# Patient Record
Sex: FEMALE | Race: OTHER | Employment: UNEMPLOYED | URBAN - METROPOLITAN AREA
[De-identification: names, ages, dates, MRNs, and addresses within clinical notes are randomized per-mention and may not be internally consistent; named-entity substitution may affect disease eponyms.]

---

## 2018-02-13 ENCOUNTER — OFFICE VISIT (OUTPATIENT)
Dept: FAMILY MEDICINE CLINIC | Age: 28
End: 2018-02-13

## 2018-02-13 VITALS
HEIGHT: 66 IN | BODY MASS INDEX: 40.98 KG/M2 | DIASTOLIC BLOOD PRESSURE: 85 MMHG | TEMPERATURE: 97.6 F | WEIGHT: 255 LBS | SYSTOLIC BLOOD PRESSURE: 135 MMHG | HEART RATE: 80 BPM

## 2018-02-13 DIAGNOSIS — B35.1 ONYCHOMYCOSIS: Primary | ICD-10-CM

## 2018-02-13 DIAGNOSIS — Z23 ENCOUNTER FOR IMMUNIZATION: ICD-10-CM

## 2018-02-13 RX ORDER — CICLOPIROX 80 MG/ML
SOLUTION TOPICAL
Qty: 1 BOTTLE | Refills: 2 | Status: SHIPPED | OUTPATIENT
Start: 2018-02-13 | End: 2021-10-10

## 2018-02-13 NOTE — PROGRESS NOTES
Statements below were documented by Mario Mcknight RNRequest for flu vaccine, patient denies any egg or latex allergy. Patient given VIS sheet and information about flu shot, verbalizes understanding and has no questions. Flu shot administered per protocol after administering injection, patient waited for 15 minutes. Patient showed no signs or symptoms of  allergic reactionRequest for TDAP vaccine, patient denies any allergies. Patient given VIS and  information about TDAP,  Patient verbalized understanding and denies questions . Tdap administered per protocol. After administered, waited 15 minutes . Patient showed no sins or symptoms if allergic reaction. Patient denies redness or itching from IM injection site. My  for this patient visit was Radha Baca.  Mario Mcknight RN

## 2018-02-13 NOTE — PROGRESS NOTES
Coordination of Care  1. Have you been to the ER, urgent care clinic since your last visit? Hospitalized since your last visit? No    2. Have you seen or consulted any other health care providers outside of the [address] [place] [address] since your last visit? Include any pap smears or colon screening. No    Medications  Does the patient need refills? NO    Learning Assessment Complete?  yes

## 2018-02-13 NOTE — MR AVS SNAPSHOT
Meredith Majestic 
 
 
 [address] [facility] [address] [place] [address] 
[phone] Patient: Marcelo Villarreal MRN: [id number] REVA:[id number] Visit Information Janeth Chambers Personal Médico Departamento Teléfono del Dep. Número de visita 2/13/2018  2:45 PM Mary Moran MD AN- [address] [phone] Upcoming Health Maintenance Date Due DTaP/Tdap/Td series (1 - Tdap) 1/6/2009 PAP AKA CERVICAL CYTOLOGY 1/6/2009 Influenza Age 5 to Adult 8/1/2017 Alergias  Review Complete El: 2/13/2018 Por: Mary Moran MD  
 A partir del:  2/13/2018 No Known Allergies Vacunas actuales Maryln Jing No hay ninguna vacuna archivada. No revisadas esta visita Partes vitales PS Pulso Temperatura [place] ( percentil de crecimiento) Peso (percentil de crecimiento) LMP (última jack) 135/85 (BP 1 Location: Left arm, BP Patient Position: Sitting) 80 97.6 °F (36.4 °C) (Oral) 5' 6.5\" (1.689 m) 255 lb (115.7 kg) 02/02/2018 BMI [facility]) Estado obstétrico Estatus de tabaquísmo 40.55 kg/m2 Having regular periods Former Smoker Historial de signos vitales BMI and BSA Data Body Mass Index Body Surface Area 40.55 kg/m 2 2.33 m 2 [facility] Name Phone [address] [facility], [address] [facility] [phone] Rainey lista de medicamentos actualizada Lista actualizada el: 2/13/18  3:36 PM.  Christine Bolanos use rainey lista de medicamentos más reciente. ciclopirox 8 % solution También conocido barby:  [place] Apply to adjacent skin and affected nails daily. Remove with alcohol every 7 days. Aplique a la piel adyacente y las uas afectadas diariamente. Elimine con alcohol cada 7 jerry. Impresion de recetas  Refills  
 ciclopirox (PENLAC) 8 % solution 2  
 Sig: Apply to adjacent skin and affected nails daily. Remove with alcohol every 7 days. Aplique a la piel adyacente y las uñas afectadas diariamente. Elimine con alcohol cada 7 días. Class: Print Instrucciones para el Paciente Hongos en las uñas de los pies: Instrucciones de cuidado - [ Toenail Fungus: Care Instructions ] Instrucciones de cuidado Janay Sicard del pie infectada por un hongo, por lo general, se torna blancuzca o amarillenta. A medida que el hongo se propaga, la uña se oscurece y [place], y los bordes comienzan a descamarse y quebrarse. Tadeo infección severa puede causar dolor en el dedo del pie y que la uña se desprenda del dedo. Es más probable que las uñas de los dedos que están expuestas a la humedad y al calor se infecten por un hongo. [place] podría ocurrir debido al uso frecuente de calzado que dorie sudar mucho el pie y a caminar descalzo sobre pisos de duchas. Los hongos de las uñas del pie son difíciles de tratar y la infección puede volver a producirse tadeo vez que se la ha eliminado. Sin embargo, a veces, los [employer] pueden [employer] de las uñas del pie para siempre. Si la infección es muy grave, o si causa dolor intenso, podría ser necesario extraer la uña. La atención de seguimiento es tadeo parte clave de russell tratamiento y seguridad. Asegúrese de hacer y acudir a todas las citas, y llame a russell médico si está teniendo problemas. También es tadeo buena idea saber los resultados de los exámenes y mantener tadeo lista de los medicamentos que evert. Cómo puede cuidarse en el hogar? · Downs los medicamentos exactamente según las indicaciones. Llame a russell médico si tiene algún problema con alan medicamentos. Usted recibirá [employer] medicamentos específicos recetados por russell médico. 
· Si russell médico le indicó tadeo crema o un líquido para aplicarse sobre la uña del pie, utilícelo exactamente según las indicaciones. · Lávese los pies con frecuencia y Felipa-Juan F virginia después de tocarse los pies. · [employer] uñas del pie secas y limpias. Séquese los pies por completo después de kevin un baño, y antes de calzarse y [employer]. · Mantenga cortas las uñas de los pies. · 2000 [address] [place] medias con frecuencia. Use medias secas que absorban la humedad. · No camine descalzo en lugares públicos. · Utilice un aerosol o talco que combata los hongos en los pies y los zapatos. · No se rasque la piel alrededor [employer]. · No use esmalte de uñas ni uñas postizas en la uñas de los dedos del pie. Cuándo debe pedir ayuda? Llame a russell médico ahora mismo o busque atención médica inmediata si: 
? · Tiene signos de infección, tales barby: ¨ Aumento del dolor, la hinchazón, el enrojecimiento o la temperatura. ¨ Vetas rojizas que salen de tadeo herida. ¨ Pus que drena del sitio. Delmus Rice. ? · Tiene un dolor nuevo o más intenso en el dedo del pie. ?Preste especial atención a los cambios en russell ana lilia y asegúrese de comunicarse con russell médico si: 
? · No mejora barby se esperaba. Dónde puede encontrar más información en inglés? Matt Lee a [email or web address] Escriba D202 en la búsqueda para aprender más acerca de \"Hongos en las uñas de los pies: Instrucciones de cuidado - [ Toenail Fungus: Care Instructions ]. \" 
Revisado: 13 octubre, 2016 Versión del contenido: 11.4 © 6337-3620 Healthwise, Incorporated. Las instrucciones de cuidado fueron adaptadas bajo licencia por Good Help Connections (which disclaims liability or warranty for this information). Si usted tiene [place] afección médica o sobre estas instrucciones, siempre pregunte a russell profesional de ana lilia. [facility], Incorporated niega toda garantía o responsabilidad por russell uso de esta información. Introducing [facility]! [facility] introduce portal paciente MyChart .  Ahora se puede acceder a partes de russell expediente médico, enviar por correo electrónico la oficina de russell médico y solicitar renovaciones de medicamentos en línea. En russell navegador de Internet , Albert Jacome a [email or web address] Dorie clic en el usuario por Barb Jhonatan? Mira Bertha clic aquí en la sesión Norberta Gaucher. Verá la página de registro [place]. Ingrese russell código de Bank of Maria Luisa aubrey y barby aparece a continuación. Usted no tendrá que [employer] código después de dilia completado el proceso de registro . Si usted no se inscribe antes de la fecha de caducidad , debe solicitar un nuevo código. · MyChart Código de acceso : [id number] Expires: 5/14/2018  3:28 PM 
 
Ingresa los últimos cuatro dígitos de russell Número de Seguro Social ( xxxx ) y fecha de nacimiento ( dd / mm / aaaa ) barby se indica y dorie clic en Enviar. Usted será llevado a la siguiente página de registro . Crear un ID MyChart . Esta será russell ID de inicio de sesión de MyChart y no puede ser [place] , por lo que pensar en tadeo que es [place] y fácil de recordar . Crear tadeo contraseña MyChart . Usted puede cambiar russell contraseña en cualquier momento . Ingrese russell Password Reset de preguntas y [place] . [place] se puede utilizar en un momento posterior si usted olvida russell contraseña. Introduzca russell dirección de correo electrónico . Christelle Dura recibirá tadeo notificación por correo electrónico cuando la nueva información está disponible en MyChart . Leilani Noss clic en Registrarse. Minerva Mine beverly y descargar porciones de russell expediente médico. 
Dorie clic en el enlace de descarga del menú Resumen para descargar tadeo copia portátil de russell información médica . Si tiene [employer] , por favor visite la sección de preguntas frecuentes del sitio web MyChart . Recuerde, MyChart NO es que se utilizará para las necesidades urgentes. Para emergencias médicas , llame al 911 . Ahora disponible en russell iPhone y Android ! Por favor proporcione caitlin resumen de la documentación de cuidado a russell próximo proveedor. If you have any questions after today's visit, please call [phone].

## 2018-02-13 NOTE — PATIENT INSTRUCTIONS
Hongos en las uñas de los pies: Instrucciones de cuidado - [ Toenail Fungus: Care Instructions ]  Instrucciones de cuidado  Tadeo uña del pie infectada por un hongo, por lo general, se torna blancuzca o amarillenta. A medida que el hongo se propaga, la uña se oscurece y [place], y los bordes comienzan a descamarse y quebrarse. Tadeo infección severa puede causar dolor en el dedo del pie y que la uña se desprenda del dedo. Es más probable que las uñas de los dedos que están expuestas a la humedad y al calor se infecten por un hongo. [place] podría ocurrir debido al uso frecuente de calzado que dorie sudar mucho el pie y a caminar descalzo sobre pisos de duchas. Los hongos de las uñas del pie son difíciles de tratar y la infección puede volver a producirse tadeo vez que se la ha eliminado. Sin embargo, a veces, los [employer] pueden [employer] de las uñas del pie para siempre. Si la infección es muy grave, o si causa dolor intenso, podría ser necesario extraer la uña. La atención de seguimiento es tadeo parte clave de russell tratamiento y seguridad. Asegúrese de hacer y acudir a todas las citas, y llame a russell médico si está teniendo problemas. También es tadeo buena idea saber los resultados de los exámenes y mantener tadeo lista de los medicamentos que evert. ¿Cómo puede cuidarse en el hogar? · [place] los medicamentos exactamente según las indicaciones. Llame a russell médico si tiene algún problema con alan medicamentos. Usted recibirá [employer] medicamentos específicos recetados por russell médico.  · Si russell médico le indicó tadeo crema o un líquido para aplicarse sobre la uña del pie, utilícelo exactamente según las indicaciones. · Lávese los pies con frecuencia y [place] virgin[place] después de tocarse los pies. · [place] uñas del pie secas y limpias. Séquese los pies por completo después de kevin un baño, y antes de calzarse y So[employer]ron Corporation. · Mantenga cortas las uñas de los pies.   · [employer] con frecuencia. Use medias secas que absorban la humedad. · No camine descalzo en lugares públicos. · Utilice un aerosol o talco que combata los hongos en los pies y los zapatos. · No se rasque la piel alrededor [employer] [place] [employer]. · No use esmalte de uñas ni uñas postizas en la uñas de los dedos del pie. ¿Cuándo debe pedir ayuda? Llame a russell médico ahora mismo o busque atención médica inmediata si:  ? · Tiene signos de infección, tales barby:  ¨ Aumento del dolor, la hinchazón, el enrojecimiento o la temperatura. ¨ Vetas rojizas que salen de tadeo herida. ¨ Pus que drena del sitio. Franklin Sushma. ? · Tiene un dolor nuevo o más intenso en el dedo del pie. ?Preste especial atención a los cambios en russell ana lilia y asegúrese de comunicarse con russell médico si:  ? · No mejora barby se esperaba. ¿Dónde puede encontrar más información en inglés? Yeni Creed a [email or web address] Escriba D202 en la búsqueda para aprender más acerca de \"Hongos en las uñas de los pies: Instrucciones de cuidado - [ Toenail Fungus: Care Instructions ]. \"  Revisado: 13 octubre, 2016  Versión del contenido: 11.4  © 2352-6185 Healthwise, Incorporated. Las instrucciones de cuidado fueron adaptadas bajo licencia por Good Help Connections (which disclaims liability or warranty for this information). Si usted tiene [place] afección médica o sobre estas instrucciones, siempre pregunte a russell profesional de ana lilia. Healthwise, Incorporated niega toda garantía o responsabilidad por russell uso de esta información.

## 2018-02-13 NOTE — PROGRESS NOTES
Kishore Lantigua is a 25 y.o. female    Issues discussed today include:    Chief Complaint   Patient presents with    Other     new CAV pt-would like a FLU shot    Nail Problem     both feet x 2 years    Immunization/Injection     flu and tdap     1) Foot problem:  Was working in [place] nad he feet would get wet, developed a fungus. Started 1 yr ago. Denies redness, pain or itching to her feet. Only all her nails affected. Has used \"unesia\" without relief. No h/o DM. Data reviewed or ordered today:       Other problems include: There is no problem list on file for this patient. Medications:  No current outpatient prescriptions on file prior to visit. No current facility-administered medications on file prior to visit. Allergies:  No Known Allergies    LMP:  Patient's last menstrual period was 02/02/2018. Social History     Social History    Marital status: SINGLE     Spouse name: N/A    Number of children: N/A    Years of education: N/A     Occupational History    Not on file. Social History Main Topics    Smoking status: Former Smoker     Years: 2.00     Types: Cigarettes     Quit date: 9/1/2017    Smokeless tobacco: Never Used    Alcohol use Yes      Comment: socially-drinks beer    Drug use: No    Sexual activity: Not on file     Other Topics Concern    Not on file     Social History Narrative    No narrative on file       No family history on file.       Physical Exam   Visit Vitals    /85 (BP 1 Location: Left arm, BP Patient Position: Sitting)    Pulse 80    Temp 97.6 °F (36.4 °C) (Oral)    Ht 5' 6.5\" (1.689 m)    Wt 255 lb (115.7 kg)    LMP 02/02/2018    BMI 40.55 kg/m2      BP Readings from Last 3 Encounters:   02/13/18 135/85     Constitutional: Appears well,  No acute distress, Vitals noted  Psychiatric:  Affect normal, Alert and Oriented to person/place/time  Eyes:  Conjunctiva clear, no drainage  ENT:  External ears and nose normal, Mucous membranes moist  Neck:  General inspection normal. Supple. Abdomen: Central obesity  Lungs:  No respiratory distress  Extremities: Without edema, good peripheral pulses  Feet: No erythema, skin breakdown, scale or ulceration. All ten toe nails with thickening, yellow/tan fungus beneath each nail. Skin:  Warm to palpation, without rashes      Assessment/Plan:      ICD-10-CM ICD-9-CM    1. Onychomycosis B35.1 110.1     All 10 toenails   2. Encounter for immunization Z23 V03.89 INFLUENZA VIRUS VAC QUAD,SPLIT,PRESV FREE SYRINGE IM      TETANUS, DIPHTHERIA TOXOIDS AND ACELLULAR PERTUSSIS VACCINE (TDAP), IN INDIVIDS. >=7, IM       Onychomycosis after prolonged exposure to moist footware  Affected all toenails, but skin appears healthy  Will trial topical ciclopirox, but if no improvement consider podiatry referral  Flu vaccine given today      Follow-up Disposition:  Return 3 months if symptoms worsen or fail to improve.         Surjit Issa MD  [address] [place] [address] [employer]

## 2020-07-24 ENCOUNTER — VIRTUAL VISIT (OUTPATIENT)
Dept: FAMILY MEDICINE CLINIC | Age: 30
End: 2020-07-24

## 2020-07-24 DIAGNOSIS — N94.6 DYSMENORRHEA: ICD-10-CM

## 2020-07-24 DIAGNOSIS — Z30.09 FAMILY PLANNING COUNSELING: Primary | ICD-10-CM

## 2020-07-24 PROBLEM — E66.9 OBESITY: Status: ACTIVE | Noted: 2020-07-24

## 2020-07-24 NOTE — PROGRESS NOTES
[address] Medicine Residency Attending Addendum:  Dr. Sim Hinds MD,  the patient and I were not physically present during this encounter. The resident and I are concurrently monitoring the patient care through appropriate telecommunication technology. I discussed the findings, assessment and plan with the resident and agree with the resident's findings and plan as documented in the resident's note.       Hung Bourgeois MD

## 2020-07-24 NOTE — PROGRESS NOTES
Evita Villagomez  26 y.o. female  1990  [address] [place] [id number]  <[id number]>   [address]:    Telemedicine Progress Note  Kaya Addison MD       Encounter Date and Time: 2020 at 9:00 AM    Consent: Evita Villagomez, who was seen by synchronous (real-time) audio-video technology, and/or her healthcare decision maker, is aware that this patient-initiated, Telehealth encounter on 2020 is a billable service, with coverage as determined by her insurance carrier. She is aware that she may receive a bill and has provided verbal consent to proceed: Yes. Chief Complaint   Patient presents with    [facility]    Menstrual Problem     History of Present Illness   Evita Villagomez is a 26 y.o. female was evaluated by synchronous (real-time) audio-video technology from home, through a secure patient portal.     Patient states she has never had a PCP. Was previously receiving care at the [address] [place] [address]. Patient would like to address birth control and dysmenorrhea. Patient states that she has always had cramping pain with menstrual periods. Dysmenorrhea is manageable, 3/10 intensity. Relieved with ibuprofen. Is currently in a monogamous relationship. Patient is requesting IUD placement after reviewing all possibilities. Currently using condoms. Menarche 13 y.o    LMP 2020  Regular periods  Has not had PAP in over 4 years. Denies history of abnormal PAP. Review of Systems   Review of Systems   Constitutional: Negative for chills and fever. HENT: Negative for congestion and sore throat. Eyes: Negative for blurred vision and double vision. Respiratory: Negative for cough, hemoptysis, sputum production, shortness of breath and wheezing. Cardiovascular: Negative for chest pain, palpitations and leg swelling.    Gastrointestinal: Negative for abdominal pain, blood in stool, constipation, diarrhea, heartburn, melena, nausea and vomiting. Genitourinary: Negative for dysuria and urgency. Dysmenorrhea    Musculoskeletal: Negative for back pain and joint pain. Skin: Negative for rash. Neurological: Negative for dizziness, focal weakness and headaches. Psychiatric/Behavioral: Negative for suicidal ideas. Vitals/Objective:     General: alert, cooperative, no distress   Mental  status: mental status: alert, oriented to person, place, and time, normal mood, behavior, speech, dress, motor activity, and thought processes   Resp: resp: normal effort and no respiratory distress   Neuro: neuro: no gross deficits   Skin: skin: no discoloration or lesions of concern on visible areas   Due to this being a TeleHealth evaluation, many elements of the physical examination are unable to be assessed. Assessment and Plan:   Time-based coding, delete if not needed: I spent at least 25 minutes with this established patient, and >50% of the time was spent counseling and/or coordinating care regarding birth control and dysmenorrhea. Assessment/Plan:  Dysmenorrhea: After reviewing all options patient would like an IUD inserted for cessation of menses. Currently using condoms and not interested in using OCPs in the interim. - Patient will be scheduled for IUD placement and well woman appointment given she is due for a PAP. - Form will be mailed to her given she works from [employer] and would not be able to stop by clinic to fill the form. Time spent in direct conversation with the patient to include medical condition(s) discussed, assessment and treatment plan:       We discussed the expected course, resolution and complications of the diagnosis(es) in detail. Medication risks, benefits, costs, interactions, and alternatives were discussed as indicated. I advised her to contact the office if her condition worsens, changes or fails to improve as anticipated. She expressed understanding with the diagnosis(es) and plan. Patient understands that this encounter was a temporary measure, and the importance of further follow up and examination was emphasized. Patient verbalized understanding. Patient informed to follow up: as soon as IUD is recieved. Electronically Signed: Marion Ruffin MD    CPT Codes [address]3619-67272 for Established Patients may apply to this Telehealth Visit. POS code: 18Kay Corona is a 26 y.o. female who was evaluated by an audio-video encounter for concerns as above. Patient identification was verified prior to start of the visit. A caregiver was present when appropriate. Due to this being a TeleHealth encounter (During [facility]- public health emergency), evaluation of the following organ systems was limited: Vitals/Constitutional/EENT/Resp/CV/GI//MS/Neuro/Skin/Heme-Lymph-Imm. Pursuant to the emergency declaration under the [address] [place] [address], [facility] waiver authority and the [employer] and [employer] Act, this Virtual Visit was conducted, with patient's (and/or legal guardian's) consent, to reduce the patient's risk of exposure to COVID-19 and provide necessary medical care. Services were provided through a synchronous discussion virtually to substitute for in-person clinic visit. I was at home. The patient was at home. History   Patients past medical, surgical and family histories were reviewed and updated. Past Medical History:   Diagnosis Date    Obesity      History reviewed. No pertinent surgical history.   Family History   Problem Relation Age of Onset    Stroke Mother     Microhematuria Father     Heart Attack Father 61    Hypertension Father     Heart Attack Maternal Grandmother     Diabetes Maternal Grandfather      Social History     Socioeconomic History    Marital status: SINGLE     Spouse name: Not on file    Number of children: Not on file    Years of education: Not on file  Highest education level: Not on file   Occupational History    Not on file   Social Needs    Financial resource strain: Not on file    Food insecurity     Worry: Not on file     Inability: Not on file    Transportation needs     Medical: Not on file     Non-medical: Not on file   Tobacco Use    Smoking status: Former Smoker     Years: 2.00     Types: Cigarettes     Last attempt to quit: 2017     Years since quittin.9    Smokeless tobacco: Never Used   Substance and Sexual Activity    Alcohol use: Yes     Alcohol/week: 1.0 standard drinks     Types: 1 Cans of beer per week     Frequency: 2-4 times a month     Binge frequency: Never     Comment: socially-drinks beer    Drug use: No    Sexual activity: Yes     Partners: Male     Birth control/protection: Condom   Lifestyle    Physical activity     Days per week: 0 days     Minutes per session: Not on file    Stress: Only a little   Relationships    Social connections     Talks on phone: Not on file     Gets together: Not on file     Attends [employer] service: Not on file     Active member of club or organization: Not on file     Attends meetings of clubs or organizations: Not on file     Relationship status: Not on file    Intimate partner violence     Fear of current or ex partner: Not on file     Emotionally abused: Not on file     Physically abused: Not on file     Forced sexual activity: Not on file   Other Topics Concern    Not on file   Social History Narrative    Not on file     Patient Active Problem List   Diagnosis Code    Obesity E66.9          Current Medications/Allergies   Medications and Allergies reviewed:    Current Outpatient Medications   Medication Sig Dispense Refill    ciclopirox (PENLAC) 8 % solution Apply to adjacent skin and affected nails daily. Remove with alcohol every 7 days. Aplique a la piel adyacente y las uñas afectadas diariamente. Elimine con alcohol cada 7 días.  1 Bottle 2     No Known Allergies

## 2020-08-12 ENCOUNTER — TELEPHONE (OUTPATIENT)
Dept: FAMILY MEDICINE CLINIC | Age: 30
End: 2020-08-12

## 2020-08-12 NOTE — TELEPHONE ENCOUNTER
Called patient with [facility]  [phone] to inform her IUD received in office. Appointment scheduled for 8/28/20 when patient will be on menstrual cycle.

## 2021-09-28 ENCOUNTER — HOSPITAL ENCOUNTER (OUTPATIENT)
Dept: LAB | Age: 31
Discharge: HOME | End: 2021-09-28

## 2021-09-28 ENCOUNTER — OFFICE VISIT (OUTPATIENT)
Dept: FAMILY MEDICINE CLINIC | Age: 31
End: 2021-09-28

## 2021-09-28 VITALS
WEIGHT: 249 LBS | TEMPERATURE: 98.1 F | SYSTOLIC BLOOD PRESSURE: 151 MMHG | HEIGHT: 67 IN | HEART RATE: 112 BPM | DIASTOLIC BLOOD PRESSURE: 94 MMHG | OXYGEN SATURATION: 98 % | BODY MASS INDEX: 39.08 KG/M2

## 2021-09-28 DIAGNOSIS — E11.65 TYPE 2 DIABETES MELLITUS WITH HYPERGLYCEMIA, WITHOUT LONG-TERM CURRENT USE OF INSULIN (HCC): ICD-10-CM

## 2021-09-28 DIAGNOSIS — R30.0 DYSURIA: Primary | ICD-10-CM

## 2021-09-28 DIAGNOSIS — R03.0 ELEVATED BLOOD PRESSURE READING: ICD-10-CM

## 2021-09-28 DIAGNOSIS — N89.8 VAGINAL DISCHARGE: ICD-10-CM

## 2021-09-28 DIAGNOSIS — R30.0 DYSURIA: ICD-10-CM

## 2021-09-28 LAB — GLUCOSE POC: NORMAL MG/DL

## 2021-09-28 PROCEDURE — 87086 URINE CULTURE/COLONY COUNT: CPT

## 2021-09-28 PROCEDURE — 99202 OFFICE O/P NEW SF 15 MIN: CPT | Performed by: NURSE PRACTITIONER

## 2021-09-28 PROCEDURE — 82962 GLUCOSE BLOOD TEST: CPT | Performed by: NURSE PRACTITIONER

## 2021-09-28 RX ORDER — FLUCONAZOLE 150 MG/1
150 TABLET ORAL DAILY
Qty: 1 TABLET | Refills: 0 | Status: SHIPPED | OUTPATIENT
Start: 2021-09-28 | End: 2021-09-29

## 2021-09-28 NOTE — PROGRESS NOTES
Aleta Paul is a 33 y.o. female    Chief Complaint   Patient presents with    Vaginal Itching     Itching, burning, vaginal discharge for the last 20 days;

## 2021-09-28 NOTE — PROGRESS NOTES
2021 : Justice Sierra (: 1990) is a 33 y.o. female, new patient, here for evaluation of the following chief complaint(s):  Vaginal Itching (Itching, burning, vaginal discharge for the last 20 days; )     ASSESSMENT/PLAN:  Below is the assessment and plan developed based on review of pertinent history, physical exam, labs, studies, and medications. 1. Dysuria  -     AMB POC GLUCOSE BLOOD, BY GLUCOSE MONITORING DEVICE  2. Vaginal discharge  -     fluconazole (DIFLUCAN) 150 mg tablet; Take 1 Tablet by mouth daily for 1 day. FDA advises cautious prescribing of oral fluconazole in pregnancy. , Normal, Disp-1 Tablet, R-0  3. Type 2 diabetes mellitus with hyperglycemia, without long-term current use of insulin ([facility] [address]  Comments:  New diagnosis 21  4. Elevated blood pressure reading    Return for 10/8/21 new diagnosis hyperglycemia. SUBJECTIVE/OBJECTIVE:  HPI   Vaginal itching with burning and vaginal discharge x 20 days. Had this a year ago. Has used Azo and vagisil. Has burning with urination and itching. Color is yellow. Like cheese. No history of high blood glucose. Has itching and redness inside. Results for orders placed or performed during the hospital encounter of 21   CULTURE, URINE    Specimen: Urine   Result Value Ref Range    Special Requests: NO SPECIAL REQUESTS      Culture result: No growth (<1,000 CFU/ML)     Results for orders placed or performed in visit on 21   AMB POC GLUCOSE BLOOD, BY GLUCOSE MONITORING DEVICE   Result Value Ref Range    Glucose  NF MG/DL       Review of Systems: Negative for: fever, chest pain, shortness of breath, leg swelling. Social History:  reports that she quit smoking about 4 years ago. Her smoking use included cigarettes. She quit after 2.00 years of use. She has never used smokeless tobacco. She reports current alcohol use of about 1.0 standard drinks of alcohol per week.  She reports that she does not use drugs. Current Medications:   Current Outpatient Medications   Medication Sig    ciclopirox (PENLAC) 8 % solution Apply to adjacent skin and affected nails daily. Remove with alcohol every 7 days. Aplique a la piel adyacente y las uñas afectadas diariamente. Elimine con alcohol cada 7 días. Physical Examination:   Vitals:    09/28/21 1307 09/28/21 1314   BP: (!) 152/117 (!) 151/94   Pulse: (!) 112    Temp: 98.1 °F (36.7 °C)    TempSrc: Temporal    SpO2: 98%    Weight: 249 lb (112.9 kg)    Height: 5' 6.5\" (1.689 m)     No LMP recorded. August last year had an apparatus in the arm. Had the period for 3 months but in December did not have period anymore. General appearance - well developed, no acute distress. Chest - clear to auscultation. Heart - regular rate and rhythm without murmurs, rubs, or gallops. Abdomen - bowel sounds present x 4, NT, ND  Extremities - no CCE. An electronic signature was used to authenticate this note.   -- Noy Cleary NP

## 2021-09-28 NOTE — PROGRESS NOTES
Went over discharge instructions and follow ups, patient verbalized understanding. I have texted to pt's mobile phone number the Good RX coupons for prescriptions today that need a coupon discount. Pt understands to show this coupon from the phone to the pharmacist.   An After Visit Summary was printed and reviewed with the patient.   Adriana Sutton

## 2021-09-30 LAB
BACTERIA SPEC CULT: NORMAL
SERVICE CMNT-IMP: NORMAL

## 2021-10-06 ENCOUNTER — TELEPHONE (OUTPATIENT)
Dept: FAMILY MEDICINE CLINIC | Age: 31
End: 2021-10-06

## 2021-10-06 DIAGNOSIS — E11.65 TYPE 2 DIABETES MELLITUS WITH HYPERGLYCEMIA, WITHOUT LONG-TERM CURRENT USE OF INSULIN (HCC): Primary | ICD-10-CM

## 2021-10-06 RX ORDER — INSULIN PUMP SYRINGE, 3 ML
EACH MISCELLANEOUS
Qty: 1 KIT | Refills: 0 | Status: SHIPPED | OUTPATIENT
Start: 2021-10-06

## 2021-10-06 RX ORDER — METFORMIN HYDROCHLORIDE 500 MG/1
500 TABLET, EXTENDED RELEASE ORAL
Qty: 30 TABLET | Refills: 0 | Status: SHIPPED | OUTPATIENT
Start: 2021-10-06 | End: 2021-10-08

## 2021-10-06 NOTE — TELEPHONE ENCOUNTER
Hospital Outpatient Visit on 2021   Component Date Value Ref Range Status    Special Requests: 2021 NO SPECIAL REQUESTS    Final    Culture result: 2021 No growth (<1,000 CFU/ML)    Final   Office Visit on 2021   Component Date Value Ref Range Status    Glucose POC 2021 351 NF  MG/DL Corrected     Current knowledge of diabetes: doesn't know anything, didn't even know she could have it  Thinks maternal grandfather had it. He has . Doesn't know what he  from, may have been a respiratory attack. What is diabetes? Discussed problem with pancreas  Will discuss foods that help    She is getting  in December and would like to try having a baby. She does not have a history of infertility. I provided basic diabetes education, discussed the medication metformin 500mg ER and what to expect, and asked for her to bring the glucometer shown below with her on Friday. Clarified that she does indeed have diabetes. Diagnoses and all orders for this visit:    1. Type 2 diabetes mellitus with hyperglycemia, without long-term current use of insulin (HCC)  -     metFORMIN ER (GLUCOPHAGE XR) 500 mg tablet; Take 1 Tablet by mouth daily (with dinner). For diabetes. Neli 1 tab con la jassi cada garrett para diabetes. -     Blood-Glucose Meter monitoring kit; Reli-On Prime. Needs test strips, lancing device, lancets please.     Silke Gomez, JUNI, FNP-BC, BC-ADM  Board Certified in Advanced Diabetes Management           $16.24     $9.00    A [employer]  ReliOn Prime Meter:  $16.24  Tiritas: 50 tiritas cuestan $9  ReliOn lancing device $5.84  ReliOn lancets 100 for $3.74, 200 for $5.89

## 2021-10-07 PROBLEM — E11.65 TYPE 2 DIABETES MELLITUS WITH HYPERGLYCEMIA, WITHOUT LONG-TERM CURRENT USE OF INSULIN (HCC): Status: ACTIVE | Noted: 2021-10-07

## 2021-10-07 NOTE — PROGRESS NOTES
10/8/2021 : Guido Shook (: 1990) is a 33 y.o. female, established patient, here for evaluation of the following chief complaint(s):  Follow-up       ASSESSMENT/PLAN:  Below is the assessment and plan developed based on review of pertinent history, physical exam, labs, studies, and medications. 1. Type 2 diabetes mellitus with hyperglycemia, without long-term current use of insulin ([facility] [address]  -      DIABETES FOOT EXAM  -     LIPID PANEL; Future  -     METABOLIC PANEL, COMPREHENSIVE; Future  -     CBC WITH AUTOMATED DIFF; Future  -     HEMOGLOBIN A1C WITH EAG; Future  -     MICROALBUMIN, UR, RAND W/ MICROALB/CREAT RATIO; Future  -     TSH 3RD GENERATION; Future  -     metFORMIN ER (GLUCOPHAGE XR) 500 mg tablet; Take 4 Tablets by mouth daily (with dinner). For diabetes. Neli 4 tab con la jassi cada garrett para diabetes. , Normal, Disp-120 Tablet, R-0Please honor goodrx coupon  2. Fungal infection of toenail  3. Elevated blood pressure reading  -reviewed glucometer measuring  -offered dietary guidance. Return for VV LK Oct 18. BP is high. Titrate up to 4 metformins. Thickened toenails throughout. Arm contraceptive. Will get  in December. If liver function normal   SUBJECTIVE/OBJECTIVE:  HPI   Current knowledge of diabetes: doesn't know anything, didn't even know she could have it  Thinks maternal grandfather had it. He has . Doesn't know what he  from, may have been a respiratory attack. What is diabetes? Discussed problem with pancreas  Discussed healthy eating plan.     She is getting  in December and would like to try having a baby. She does not have a history of infertility. I provided basic diabetes education, discussed the medication metformin 500mg ER and what to expect, and asked for her to bring the glucometer shown below with her on Friday.   Results for orders placed or performed during the hospital encounter of 10/08/21   CBC WITH AUTOMATED DIFF   Result Value Ref Range    WBC 9.5 3.6 - 11.0 K/uL    RBC 5.46 (H) 3.80 - 5.20 M/uL    HGB 12.4 11.5 - 16.0 g/dL    HCT 40.8 35.0 - 47.0 %    MCV 74.7 (L) 80.0 - 99.0 FL    MCH 22.7 (L) 26.0 - 34.0 PG    MCHC 30.4 30.0 - 36.5 g/dL    RDW 18.3 (H) 11.5 - 14.5 %    PLATELET 920 327 - 519 K/uL    MPV 10.6 8.9 - 12.9 FL    NRBC 0.0 0  WBC    ABSOLUTE NRBC 0.00 0.00 - 0.01 K/uL    NEUTROPHILS 56 32 - 75 %    LYMPHOCYTES 34 12 - 49 %    MONOCYTES 7 5 - 13 %    EOSINOPHILS 2 0 - 7 %    BASOPHILS 1 0 - 1 %    IMMATURE GRANULOCYTES 0 0.0 - 0.5 %    ABS. NEUTROPHILS 5.3 1.8 - 8.0 K/UL    ABS. LYMPHOCYTES 3.2 0.8 - 3.5 K/UL    ABS. MONOCYTES 0.6 0.0 - 1.0 K/UL    ABS. EOSINOPHILS 0.2 0.0 - 0.4 K/UL    ABS. BASOPHILS 0.1 0.0 - 0.1 K/UL    ABS. IMM. GRANS. 0.0 0.00 - 0.04 K/UL    DF AUTOMATED     METABOLIC PANEL, COMPREHENSIVE   Result Value Ref Range    Sodium 135 (L) 136 - 145 mmol/L    Potassium 4.4 3.5 - 5.1 mmol/L    Chloride 102 97 - 108 mmol/L    CO2 26 21 - 32 mmol/L    Anion gap 7 5 - 15 mmol/L    Glucose 188 (H) 65 - 100 mg/dL    BUN 9 6 - 20 MG/DL    Creatinine 0.67 0.55 - 1.02 MG/DL    BUN/Creatinine ratio 13 12 - 20      GFR est AA >60 >60 ml/min/1.73m2    GFR est non-AA >60 >60 ml/min/1.73m2    Calcium 9.5 8.5 - 10.1 MG/DL    Bilirubin, total 0.4 0.2 - 1.0 MG/DL    ALT (SGPT) 23 12 - 78 U/L    AST (SGOT) 13 (L) 15 - 37 U/L    Alk.  phosphatase 171 (H) 45 - 117 U/L    Protein, total 7.5 6.4 - 8.2 g/dL    Albumin 3.7 3.5 - 5.0 g/dL    Globulin 3.8 2.0 - 4.0 g/dL    A-G Ratio 1.0 (L) 1.1 - 2.2     LIPID PANEL   Result Value Ref Range    Cholesterol, total 227 (H) <200 MG/DL    Triglyceride 79 <150 MG/DL    HDL Cholesterol 66 MG/DL    LDL, calculated 145.2 (H) 0 - 100 MG/DL    VLDL, calculated 15.8 MG/DL    CHOL/HDL Ratio 3.4 0.0 - 5.0     TSH 3RD GENERATION   Result Value Ref Range    TSH 1.68 0.36 - 3.74 uIU/mL   HEMOGLOBIN A1C WITH EAG   Result Value Ref Range    Hemoglobin A1c 11.1 (H) 4.0 - 5.6 % Est. average glucose 272 mg/dL   MICROALBUMIN, UR, RAND W/ MICROALB/CREAT RATIO   Result Value Ref Range    Microalbumin,urine random 10.70 MG/DL    Creatinine, urine 144.00 mg/dL    Microalbumin/Creat ratio (mg/g creat) 74 (H) 0 - 30 mg/g     Date Fasting Breakfast  Before    After Lunch  Before       After Dinner  Before          After Before   bed   10/8  Friday     351      10/7  Thurs        279   Latest Lab Result Choices:   Lab Results   Component Value Date/Time    Hemoglobin A1c 11.1 (H) 10/08/2021 09:30 AM    Glucose 188 (H) 10/08/2021 09:30 AM    Glucose  NF 09/28/2021 01:48 PM    Microalbumin/Creat ratio (mg/g creat) 74 (H) 10/08/2021 09:30 AM    Microalbumin,urine random 10.70 10/08/2021 09:30 AM    LDL, calculated 145.2 (H) 10/08/2021 09:30 AM    Creatinine 0.67 10/08/2021 09:30 AM      Review of Systems:   General: No fever. Eyes: No blurry or double vision. Cardiovascular: No chest pain, dyspnea, or TIAs. Endocrine: No polydipsia or polyphagia. No weight loss. No hypoglycemic symptoms. Respiratory: No shortness of breath. Musculoskeletal: No myalgias. Genitourinary: No polyuria. Neurological: No numbness/tingling/pain in extremities. Extremities: No swelling. Social History:  reports that she quit smoking about 4 years ago. Her smoking use included cigarettes. She quit after 2.00 years of use. She has never used smokeless tobacco. She reports current alcohol use of about 1.0 standard drinks of alcohol per week. She reports that she does not use drugs. Current Medications:   Current Outpatient Medications   Medication Sig    metFORMIN ER (GLUCOPHAGE XR) 500 mg tablet Take 4 Tablets by mouth daily (with dinner). For diabetes. Neli 4 tab con la jassi cada garrett para diabetes.  Blood-Glucose Meter monitoring kit Reli-On Prime. Needs test strips, lancing device, lancets please.  ciclopirox (PENLAC) 8 % solution Apply to adjacent skin and affected nails daily.  Remove with alcohol every 7 days. Aplique a la piel adyacente y las uñas afectadas diariamente. Elimine con alcohol cada 7 días. Physical Examination:   Vitals:    10/08/21 0847 10/08/21 0849   BP: (!) 140/93 (!) 145/90   Pulse: 87    Temp: 97.9 °F (36.6 °C)    TempSrc: Temporal    SpO2: 99%    Weight: 249 lb (112.9 kg)    Height: 5' 6.5\" (1.689 m)     No LMP recorded. Has contraception implant in the arm. General appearance - well developed, no acute distress. Integumentary - acanthosis nigricans noted of the neck. Chest - clear to auscultation. Heart - regular rate and rhythm without murmurs, rubs, or gallops. Abdomen - bowel sounds present x 4, NT, ND  Extremities - no CCE. Diabetes foot exam performed by Abilio Thomas NP     Measurement  Right Left   Monofilament  R - normal sensation with micro filament    L - normal sensation with micro filament    Pulse DP R - 2+ (normal)   L - 2+ (normal)   Pulse TP R - 2+ (normal)   L - 2+ (normal)   Structural deformity R - None   L - None   Skin Integrity / Deformity R - None    L - None         An electronic signature was used to authenticate this note.   -- Abilio Thomas NP

## 2021-10-08 ENCOUNTER — HOSPITAL ENCOUNTER (OUTPATIENT)
Dept: LAB | Age: 31
Discharge: HOME | End: 2021-10-08

## 2021-10-08 ENCOUNTER — OFFICE VISIT (OUTPATIENT)
Dept: FAMILY MEDICINE CLINIC | Age: 31
End: 2021-10-08

## 2021-10-08 VITALS
DIASTOLIC BLOOD PRESSURE: 90 MMHG | HEART RATE: 87 BPM | TEMPERATURE: 97.9 F | HEIGHT: 66 IN | SYSTOLIC BLOOD PRESSURE: 145 MMHG | BODY MASS INDEX: 40.02 KG/M2 | WEIGHT: 249 LBS | OXYGEN SATURATION: 99 %

## 2021-10-08 DIAGNOSIS — E11.65 TYPE 2 DIABETES MELLITUS WITH HYPERGLYCEMIA, WITHOUT LONG-TERM CURRENT USE OF INSULIN (HCC): Primary | ICD-10-CM

## 2021-10-08 DIAGNOSIS — R03.0 ELEVATED BLOOD PRESSURE READING: ICD-10-CM

## 2021-10-08 DIAGNOSIS — B35.1 FUNGAL INFECTION OF TOENAIL: ICD-10-CM

## 2021-10-08 DIAGNOSIS — E11.65 TYPE 2 DIABETES MELLITUS WITH HYPERGLYCEMIA, WITHOUT LONG-TERM CURRENT USE OF INSULIN (HCC): ICD-10-CM

## 2021-10-08 PROCEDURE — 82043 UR ALBUMIN QUANTITATIVE: CPT

## 2021-10-08 PROCEDURE — 85025 COMPLETE CBC W/AUTO DIFF WBC: CPT

## 2021-10-08 PROCEDURE — 99215 OFFICE O/P EST HI 40 MIN: CPT | Performed by: NURSE PRACTITIONER

## 2021-10-08 PROCEDURE — 84443 ASSAY THYROID STIM HORMONE: CPT

## 2021-10-08 PROCEDURE — 80053 COMPREHEN METABOLIC PANEL: CPT

## 2021-10-08 PROCEDURE — 83036 HEMOGLOBIN GLYCOSYLATED A1C: CPT

## 2021-10-08 PROCEDURE — 80061 LIPID PANEL: CPT

## 2021-10-08 RX ORDER — METFORMIN HYDROCHLORIDE 500 MG/1
2000 TABLET, EXTENDED RELEASE ORAL
Qty: 120 TABLET | Refills: 0 | Status: SHIPPED | OUTPATIENT
Start: 2021-10-08 | End: 2021-10-18 | Stop reason: SDUPTHER

## 2021-10-08 NOTE — PROGRESS NOTES
Coordination of Care  1. Have you been to the ER, urgent care clinic since your last visit? Hospitalized since your last visit? No    2. Have you seen or consulted any other health care providers outside of the [address] [place] [address] since your last visit? Include any pap smears or colon screening. No    Does the patient need refills? NO    Learning Assessment Complete? yes  Depression Screening complete in the past 12 months? yes     [facility] HAS NOT GLUCOSE TO DO THE FINGER STICK.  BHASKAR ARTHUR IS AWARE OF THE SITUATION

## 2021-10-08 NOTE — PROGRESS NOTES
Pt sent [address] e-mail link today after verifying patient's email address is correct. I explained to pt to please open this link from e-mail and complete sign up process today. Pt told that they might have to enter last 4 digits of SSN and if they do not have SSN to enter [phone]. Also, told pt that they will need to create user name, password,  and zipcode to start sign up process. An After Visit Summary was printed and reviewed with the patient.   Sindi Mrecer

## 2021-10-08 NOTE — PATIENT INSTRUCTIONS
Date Fecha Fasting  En Ayunas Breakfast  Before    After  Antes  Despues Lunch  Before       After Dinner  Before          After Before   bed           si   10/7  Thurs   si si   si  8 pm 279 9 pm Met si   10/8   222          10/9             10/10             10/11             10/12               Escribe los kenna cada garrett. Hablamos pronto. En ayunas 80 a 130. Do el garrett 100 a 180. Hoy evert 2 metforminas. Neeraj evert 3 metformins. Krystyna evert 4 metformin. Continue 4 metformins.

## 2021-10-09 LAB
ALBUMIN SERPL-MCNC: 3.7 G/DL (ref 3.5–5)
ALBUMIN/GLOB SERPL: 1 {RATIO} (ref 1.1–2.2)
ALP SERPL-CCNC: 171 U/L (ref 45–117)
ALT SERPL-CCNC: 23 U/L (ref 12–78)
ANION GAP SERPL CALC-SCNC: 7 MMOL/L (ref 5–15)
AST SERPL-CCNC: 13 U/L (ref 15–37)
BASOPHILS # BLD: 0.1 K/UL (ref 0–0.1)
BASOPHILS NFR BLD: 1 % (ref 0–1)
BILIRUB SERPL-MCNC: 0.4 MG/DL (ref 0.2–1)
BUN SERPL-MCNC: 9 MG/DL (ref 6–20)
BUN/CREAT SERPL: 13 (ref 12–20)
CALCIUM SERPL-MCNC: 9.5 MG/DL (ref 8.5–10.1)
CHLORIDE SERPL-SCNC: 102 MMOL/L (ref 97–108)
CHOLEST SERPL-MCNC: 227 MG/DL
CO2 SERPL-SCNC: 26 MMOL/L (ref 21–32)
CREAT SERPL-MCNC: 0.67 MG/DL (ref 0.55–1.02)
CREAT UR-MCNC: 144 MG/DL
DIFFERENTIAL METHOD BLD: ABNORMAL
EOSINOPHIL # BLD: 0.2 K/UL (ref 0–0.4)
EOSINOPHIL NFR BLD: 2 % (ref 0–7)
ERYTHROCYTE [DISTWIDTH] IN BLOOD BY AUTOMATED COUNT: 18.3 % (ref 11.5–14.5)
EST. AVERAGE GLUCOSE BLD GHB EST-MCNC: 272 MG/DL
GLOBULIN SER CALC-MCNC: 3.8 G/DL (ref 2–4)
GLUCOSE SERPL-MCNC: 188 MG/DL (ref 65–100)
HBA1C MFR BLD: 11.1 % (ref 4–5.6)
HCT VFR BLD AUTO: 40.8 % (ref 35–47)
HDLC SERPL-MCNC: 66 MG/DL
HDLC SERPL: 3.4 {RATIO} (ref 0–5)
HGB BLD-MCNC: 12.4 G/DL (ref 11.5–16)
IMM GRANULOCYTES # BLD AUTO: 0 K/UL (ref 0–0.04)
IMM GRANULOCYTES NFR BLD AUTO: 0 % (ref 0–0.5)
LDLC SERPL CALC-MCNC: 145.2 MG/DL (ref 0–100)
LYMPHOCYTES # BLD: 3.2 K/UL (ref 0.8–3.5)
LYMPHOCYTES NFR BLD: 34 % (ref 12–49)
MCH RBC QN AUTO: 22.7 PG (ref 26–34)
MCHC RBC AUTO-ENTMCNC: 30.4 G/DL (ref 30–36.5)
MCV RBC AUTO: 74.7 FL (ref 80–99)
MICROALBUMIN UR-MCNC: 10.7 MG/DL
MICROALBUMIN/CREAT UR-RTO: 74 MG/G (ref 0–30)
MONOCYTES # BLD: 0.6 K/UL (ref 0–1)
MONOCYTES NFR BLD: 7 % (ref 5–13)
NEUTS SEG # BLD: 5.3 K/UL (ref 1.8–8)
NEUTS SEG NFR BLD: 56 % (ref 32–75)
NRBC # BLD: 0 K/UL (ref 0–0.01)
NRBC BLD-RTO: 0 PER 100 WBC
PLATELET # BLD AUTO: 357 K/UL (ref 150–400)
PMV BLD AUTO: 10.6 FL (ref 8.9–12.9)
POTASSIUM SERPL-SCNC: 4.4 MMOL/L (ref 3.5–5.1)
PROT SERPL-MCNC: 7.5 G/DL (ref 6.4–8.2)
RBC # BLD AUTO: 5.46 M/UL (ref 3.8–5.2)
SODIUM SERPL-SCNC: 135 MMOL/L (ref 136–145)
TRIGL SERPL-MCNC: 79 MG/DL (ref ?–150)
TSH SERPL DL<=0.05 MIU/L-ACNC: 1.68 UIU/ML (ref 0.36–3.74)
VLDLC SERPL CALC-MCNC: 15.8 MG/DL
WBC # BLD AUTO: 9.5 K/UL (ref 3.6–11)

## 2021-10-11 NOTE — PROGRESS NOTES
Your average glucose is 272 (A1c = 11.1). Your blood counts and metabolic panel (except for high glucose) are normal.  You have protein in the urine.

## 2021-10-18 ENCOUNTER — VIRTUAL VISIT (OUTPATIENT)
Dept: FAMILY MEDICINE CLINIC | Age: 31
End: 2021-10-18

## 2021-10-18 DIAGNOSIS — E11.65 TYPE 2 DIABETES MELLITUS WITH HYPERGLYCEMIA, WITHOUT LONG-TERM CURRENT USE OF INSULIN (HCC): ICD-10-CM

## 2021-10-18 PROCEDURE — 99443 PR PHYS/QHP TELEPHONE EVALUATION 21-30 MIN: CPT | Performed by: NURSE PRACTITIONER

## 2021-10-18 RX ORDER — METFORMIN HYDROCHLORIDE 500 MG/1
TABLET, EXTENDED RELEASE ORAL
Qty: 120 TABLET | Refills: 0 | Status: SHIPPED | OUTPATIENT
Start: 2021-10-18 | End: 2021-10-25 | Stop reason: SDUPTHER

## 2021-10-18 NOTE — PROGRESS NOTES
Tc intake with AMN Int #     No vs equipment available today. BS fasting was 95 this am. At 8 am.   All week it has been running 180-160 in the mornings. Coordination of Care  1. Have you been to the ER, urgent care clinic since your last visit? Hospitalized since your last visit? Yes When: went to [facility] yesterday. 2. Have you seen or consulted any other health care providers outside of the [address] [place] [address] since your last visit? Include any pap smears or colon screening. No    Does the patient need refills?  NO    Learning Assessment Complete? no  Depression Screening complete in the past 12 months? yes

## 2021-10-18 NOTE — Clinical Note
A1c 11.1. I started her on metformin 500mg, 4 po qday. Fasting glucoses range from . Now eating a restricted diet and exercising. Getting  in December and desires pregnancy. I will have her see you. In the meantime, would we want to consider adding insulin vs glyburide?   Thank you, Zofia Gallo

## 2021-10-18 NOTE — PROGRESS NOTES
: Dada Bull  Patient identification verified with 2 identifiers. Consent: She and/or health care decision maker has provided verbal consent to proceed: Yes   Total Time: minutes: 11-20 minutes  Pursuant to the emergency declaration under the [facility], [facility] and the [employer] and [employer] Act, this Virtual Telephone Visit was conducted to reduce the patient's risk of exposure to COVID-19. Assessment/Plan:   Diagnoses and all orders for this visit:    1. Type 2 diabetes mellitus with hyperglycemia, without long-term current use of insulin (HCC)  -     metFORMIN ER (GLUCOPHAGE XR) 500 mg tablet; 2 po bid ac breakfast and dinner for diabetes. Neli 2 tab antes de desayuno y 2 tab antes de la jassi para diabetes. -     REFERRAL TO ENDOCRINOLOGY - I reached out to Daphney Lawrence MD for guidance and will be referring patient to see him before/during a pregnancy. Follow-up and Dispositions    · Return for VV LK 1 week. VV 1 week  Measure fasting glucose and before dinner  Take 2 po bid of metformin. She feels some pressure in her chest when she takes the metformin. Try the metformin before the meal.    Subjective:   Minnie Goldman is a 33 y.o. female evaluated via telephone on 10/18/2021. Chief Complaint   Patient presents with   [facility]     lab results. She ate only vegetables with boiled eggs last night, leading to a fasting glucose of 95 this morning. Today she went to the gym from 5:30 am to 6:30 am, then at 10 am she ate 2 boiled eggs and coffee. Her typical schedule is going to work from 8 am until 6 pm.  Eats some yogurt and a fruit at 10:00 am and lunch is at 2 pm.  Chicken and salad. Date   Fasting Before lunch Before Dinner HS    10/18 95       10/17 160       10/16 180       She felt bloating in her stomach when she took 4 metformins at night.     History of Present Illness  Not waking up at night to urinate. Review of Systems   General: No fever. Eyes: No blurry or double vision. Cardiovascular: No chest pain, dyspnea, or TIAs. Endocrine: No polydipsia or polyphagia. No weight loss. No hypoglycemic symptoms. Respiratory: No shortness of breath. Musculoskeletal: No myalgias. Genitourinary: No polyuria. Neurological: No numbness/tingling/pain in extremities. Extremities: No swelling. Objective:     Latest Lab Result Choices discussed today:   Lab Results   Component Value Date/Time    Hemoglobin A1c 11.1 (H) 10/08/2021 09:30 AM    Glucose 188 (H) 10/08/2021 09:30 AM    Glucose  NF 09/28/2021 01:48 PM    Microalbumin/Creat ratio (mg/g creat) 74 (H) 10/08/2021 09:30 AM    Microalbumin,urine random 10.70 10/08/2021 09:30 AM    LDL, calculated 145.2 (H) 10/08/2021 09:30 AM    Creatinine 0.67 10/08/2021 09:30 AM      Current Outpatient Medications   Medication Sig    metFORMIN ER (GLUCOPHAGE XR) 500 mg tablet 2 po bid ac breakfast and dinner for diabetes. Neli 2 tab antes de desayuno y 2 tab antes de la jassi para diabetes.  Blood-Glucose Meter monitoring kit Reli-On Prime. Needs test strips, lancing device, lancets please. No current facility-administered medications for this visit. No physical exam performed due to telephone visit. I affirm this is a Patient Initiated Episode with a Patient who has not had a related appointment within my department in the past 7 days or scheduled within the next 24 hours. CHESTER Tsai expressed understanding and agreed to this plan.

## 2021-10-23 NOTE — PROGRESS NOTES
Nacho Harmon,    In response to your question:    ===View-only below this line===  ----- Message -----  From: Breonna Hopkins NP  Sent: 10/18/2021  11:22 AM EDT  To: Elsie Valdez MD    A1c 11.1. I started her on metformin 500mg, 4 po qday. Fasting glucoses range from . Now eating a restricted diet and exercising. Getting  in December and desires pregnancy. I will have her see you. In the meantime, would we want to consider adding insulin vs glyburide? Thank you, Nacho Harmon  -------------------------------------------------------------------------------------------------------------------  If she desires pregnancy, it would be reasonable to consider bedtime NPH as the next step if she is not able to keep her fasting sugars under 100 on the metformin alone and you can start at 10 units at bedtime and have her titrate by 4 units every 4 days until her sugar stays under 100.      Let me know if you need anything else with her,  Hermelindo Ng

## 2021-10-25 ENCOUNTER — VIRTUAL VISIT (OUTPATIENT)
Dept: FAMILY MEDICINE CLINIC | Age: 31
End: 2021-10-25

## 2021-10-25 DIAGNOSIS — E11.65 TYPE 2 DIABETES MELLITUS WITH HYPERGLYCEMIA, WITHOUT LONG-TERM CURRENT USE OF INSULIN (HCC): Primary | ICD-10-CM

## 2021-10-25 PROCEDURE — 99442 PR PHYS/QHP TELEPHONE EVALUATION 11-20 MIN: CPT | Performed by: NURSE PRACTITIONER

## 2021-10-25 RX ORDER — NAPROXEN SODIUM 220 MG
1 TABLET ORAL
Qty: 100 EACH | Refills: 0 | Status: SHIPPED | OUTPATIENT
Start: 2021-10-25 | End: 2022-09-12 | Stop reason: SDUPTHER

## 2021-10-25 RX ORDER — METFORMIN HYDROCHLORIDE 500 MG/1
TABLET, EXTENDED RELEASE ORAL
Qty: 360 TABLET | Refills: 1 | Status: SHIPPED | OUTPATIENT
Start: 2021-10-25 | End: 2022-01-11 | Stop reason: SDUPTHER

## 2021-10-25 NOTE — PROGRESS NOTES
: [facility] [place] # [id number]  Patient identification verified with 2 identifiers. Consent: She and/or health care decision maker has provided verbal consent to proceed: Yes   Total Time: minutes: 11-20 minutes  Pursuant to the emergency declaration under the [facility], [facility] and the [employer] and [employer] Act, this Virtual Telephone Visit was conducted to reduce the patient's risk of exposure to COVID-19. Assessment/Plan:   Diagnoses and all orders for this visit:    1. Type 2 diabetes mellitus with hyperglycemia, without long-term current use of insulin (HCC)  -     metFORMIN ER (GLUCOPHAGE XR) 500 mg tablet; 2 po bid ac breakfast and dinner for diabetes. Neli 2 tab antes de desayuno y 2 tab antes de la jassi para diabetes. -     insulin NPH (NOVOLIN N, HUMULIN N) 100 unit/mL injection; 10 units sc qhs; if fasting glucoses are > 100, increase by 4 units every 4 days  -     Insulin Syringe-Needle U-100 1/2 mL 30 gauge syrg; 1 Syringe by Does Not Apply route nightly. Use as directed      Follow-up and Dispositions    · Return for Zuri MAYBERRY Insulin Teaching, then WILLIAM VV shortly thereafter. Subjective:   Jeff Oconnor is a 33 y.o. female evaluated via telephone on 10/25/2021.     Chief Complaint   Patient presents with    Follow Up Chronic Condition     DM     Day Fasting Breakfast  Before    After Lunch  Before       After Dinner  Before       After Before bed   Tues  10/19 90   150  124  169   Wed  10/20 137   139   150    Thur 122   152  132     Fri 139   117   118    Sat 137   180    115     Day Fasting Breakfast  Before    After Lunch  Before       After Dinner  Before       After Before bed   Sun 129    119   119   Mon 149          Per Dr. Evan Whitfield:  If she desires pregnancy, it would be reasonable to consider bedtime NPH as the next step if she is not able to keep her fasting sugars under 100 on the metformin alone and you can start at 10 units at bedtime and have her titrate by 4 units every 4 days until her sugar stays under 100. She is exercising, walking a lot, feeling very very well. History of Present Illness    Review of Systems   General: No fever. Eyes: No blurry or double vision. Cardiovascular: No chest pain, dyspnea, or TIAs. Endocrine: No polydipsia or polyphagia. No weight loss. No hypoglycemic symptoms. Respiratory: No shortness of breath. Musculoskeletal: No myalgias. Genitourinary: No polyuria. Neurological: No numbness/tingling/pain in extremities. Extremities: No swelling. Objective:     Latest Lab Result Choices discussed today:   Lab Results   Component Value Date/Time    Hemoglobin A1c 11.1 (H) 10/08/2021 09:30 AM    Glucose 188 (H) 10/08/2021 09:30 AM    Glucose  NF 09/28/2021 01:48 PM    Microalbumin/Creat ratio (mg/g creat) 74 (H) 10/08/2021 09:30 AM    Microalbumin,urine random 10.70 10/08/2021 09:30 AM    LDL, calculated 145.2 (H) 10/08/2021 09:30 AM    Creatinine 0.67 10/08/2021 09:30 AM      Current Outpatient Medications   Medication Sig    metFORMIN ER (GLUCOPHAGE XR) 500 mg tablet 2 po bid ac breakfast and dinner for diabetes. Neli 2 tab antes de desayuno y 2 tab antes de la jassi para diabetes.  insulin NPH (NOVOLIN N, HUMULIN N) 100 unit/mL injection 10 units sc qhs; if fasting glucoses are > 100, increase by 4 units every 4 days    Insulin Syringe-Needle U-100 1/2 mL 30 gauge syrg 1 Syringe by Does Not Apply route nightly. Use as directed    Blood-Glucose Meter monitoring kit Reli-On Prime. Needs test strips, lancing device, lancets please. No current facility-administered medications for this visit. No physical exam performed due to telephone visit. I affirm this is a Patient Initiated Episode with a Patient who has not had a related appointment within my department in the past 7 days or scheduled within the next 24 hours.   Kelechi Guerrero CHESTER Lepe Level expressed understanding and agreed to this plan.

## 2021-10-25 NOTE — PROGRESS NOTES
Tc to the pt 2x. The pt answered. Intake was done with  # 23637. Blood glucose readings per the pt.= Tues 10/19/21 7am =90, 11:50 am 150,  5pm 124, 10:23 169. Wed 10/20/21=  7am 137, 1:17 pm 139, 8:50 pm 150,     Thur 5:17 am 122, 12n 152, 5pm 132,     Fri 7 am 139, 12 117, 6pm 118,     Sat 7am 137, 12 180, 2 pm 138, 11:50 pm 115,     Sun 8:57 129, 1pm 119, 12am 119,   7:00 am fasting 149    No vs equipment is available. Coordination of Care  1. Have you been to the ER, urgent care clinic since your last visit? Hospitalized since your last visit? No    2. Have you seen or consulted any other health care providers outside of the [address] [place] [address] since your last visit? Include any pap smears or colon screening. No    Does the patient need refills?  YES    Learning Assessment Complete? no  Depression Screening complete in the past 12 months? yes

## 2021-11-17 ENCOUNTER — OFFICE VISIT (OUTPATIENT)
Dept: FAMILY MEDICINE CLINIC | Age: 31
End: 2021-11-17

## 2021-11-17 ENCOUNTER — DOCUMENTATION ONLY (OUTPATIENT)
Dept: FAMILY MEDICINE CLINIC | Age: 31
End: 2021-11-17

## 2021-11-17 DIAGNOSIS — Z23 NEEDS FLU SHOT: Primary | ICD-10-CM

## 2021-11-17 PROCEDURE — 90686 IIV4 VACC NO PRSV 0.5 ML IM: CPT

## 2021-11-17 PROCEDURE — 99214 OFFICE O/P EST MOD 30 MIN: CPT | Performed by: NURSE PRACTITIONER

## 2021-11-17 PROCEDURE — 90471 IMMUNIZATION ADMIN: CPT

## 2021-11-17 NOTE — PROGRESS NOTES
Taina Alegria  completed screening documentation. Patient asking for the flu vaccine today. No contraindications for administering vaccines listed or stated. Immunization  given per policy. Entered  Into [employer]. Vaccine Immunization Statement(s) given and instructions regarding adverse reaction. Instructed to go to Emergency Dept iIf rash or swelling of face or shortness of breath appeared. Also instructed to wait 10-15 min at site to observe for reaction. No adverse reaction noted at time of discharge. Merrilyn Kanner        .

## 2021-11-22 ENCOUNTER — VIRTUAL VISIT (OUTPATIENT)
Dept: FAMILY MEDICINE CLINIC | Age: 31
End: 2021-11-22

## 2021-11-22 DIAGNOSIS — E11.65 TYPE 2 DIABETES MELLITUS WITH HYPERGLYCEMIA, UNSPECIFIED WHETHER LONG TERM INSULIN USE (HCC): Primary | ICD-10-CM

## 2021-11-22 PROCEDURE — 99442 PR PHYS/QHP TELEPHONE EVALUATION 11-20 MIN: CPT | Performed by: NURSE PRACTITIONER

## 2021-11-22 NOTE — PROGRESS NOTES
Coordination of Care  1. Have you been to the ER, urgent care clinic since your last visit? Hospitalized since your last visit? No    2. Have you seen or consulted any other health care providers outside of the [address] [place] [address] since your last visit? Include any pap smears or colon screening. No    Does the patient need refills? NO    Learning Assessment Complete?  yes  Depression Screening complete in the past 12 months? yes

## 2021-11-22 NOTE — PROGRESS NOTES
: Irena Paul  Patient identification verified with 2 identifiers. Consent: She and/or health care decision maker has provided verbal consent to proceed: Yes   Total Time: minutes: 11-20 minutes  Pursuant to the emergency declaration under the [facility], [facility] authority and the [employer] and [employer] Act, this Virtual Telephone Visit was conducted to reduce the patient's risk of exposure to COVID-19. Assessment/Plan:   Diagnoses and all orders for this visit:    1. Type 2 diabetes mellitus with hyperglycemia, unspecified whether long term insulin use (HCC)  -     insulin NPH (NOVOLIN N, HUMULIN N) 100 unit/mL injection; 12 units sc qhs; if fasting glucoses are > 100, increase by 4 units every 4 days    Start with 12 units NPH sc qhs tonight. VV LK 2 weeks. Has new patient appointment with Dr. Ara Nava on 2/16/22. Subjective:   Soren Cheney is a 33 y.o. female evaluated via telephone on 11/22/2021. Chief Complaint   Patient presents with    Diabetes     f/u      History of Present Illness Taking metformin 500mg ER 2 po bid and injecting NPH insulin 10 units sc qhs at 10 pm.  Fasting glucoses Tues: 130, before lunch 120, after lunch 116  Wed: fasting 110, before lunch 93  Thurs 132 fasting, after breakfast 122, before lunch 115  Today (Mon): 135 fasting, hasn't eaten lunch yet. 2 hours after breakfast today: 130. Injects insulin 10 units NPH at 10 pm.      Review of Systems   General: No fever. Eyes: No blurry or double vision. Cardiovascular: No chest pain, dyspnea, or TIAs. Endocrine: No polydipsia or polyphagia. No weight loss. No hypoglycemic symptoms. Respiratory: No shortness of breath. Musculoskeletal: No myalgias. Genitourinary: No polyuria. Neurological: No numbness/tingling/pain in extremities. Extremities: No swelling.     Objective:     Latest Lab Result Choices discussed today:   Lab Results   Component Value Date/Time    Hemoglobin A1c 11.1 (H) 10/08/2021 09:30 AM    Glucose 188 (H) 10/08/2021 09:30 AM    Glucose  NF 09/28/2021 01:48 PM    Microalbumin/Creat ratio (mg/g creat) 74 (H) 10/08/2021 09:30 AM    Microalbumin,urine random 10.70 10/08/2021 09:30 AM    LDL, calculated 145.2 (H) 10/08/2021 09:30 AM    Creatinine 0.67 10/08/2021 09:30 AM      Current Outpatient Medications   Medication Sig    metFORMIN ER (GLUCOPHAGE XR) 500 mg tablet 2 po bid ac breakfast and dinner for diabetes. Neli 2 tab antes de desayuno y 2 tab antes de la jassi para diabetes.  insulin NPH (NOVOLIN N, HUMULIN N) 100 unit/mL injection 10 units sc qhs; if fasting glucoses are > 100, increase by 4 units every 4 days    Insulin Syringe-Needle U-100 1/2 mL 30 gauge syrg 1 Syringe by Does Not Apply route nightly. Use as directed    Blood-Glucose Meter monitoring kit Rel[employer] Prime. Needs test strips, lancing device, lancets please. No current facility-administered medications for this visit. No physical exam performed due to telephone visit. I affirm this is a Patient Initiated Episode with a Patient who has not had a related appointment within my department in the past 7 days or scheduled within the next 24 hours. CHESTER Flowers expressed understanding and agreed to this plan.

## 2021-11-22 NOTE — Clinical Note
Her fasting glucoses were consistently well above 100 mg/dL. To be cautious, I instructed her to increase her NPH by 2 units sc qhs starting tonight. She plans to take out the birth control device in January. I will follow up with her in 2 weeks. What do you recommend at this time?   Thanks, Robson Hood

## 2021-12-06 ENCOUNTER — VIRTUAL VISIT (OUTPATIENT)
Dept: FAMILY MEDICINE CLINIC | Age: 31
End: 2021-12-06

## 2021-12-06 DIAGNOSIS — E11.65 TYPE 2 DIABETES MELLITUS WITH HYPERGLYCEMIA, UNSPECIFIED WHETHER LONG TERM INSULIN USE (HCC): Primary | ICD-10-CM

## 2021-12-06 DIAGNOSIS — Z31.9 DESIRE FOR PREGNANCY: ICD-10-CM

## 2021-12-06 PROCEDURE — 99442 PR PHYS/QHP TELEPHONE EVALUATION 11-20 MIN: CPT | Performed by: NURSE PRACTITIONER

## 2021-12-06 NOTE — PROGRESS NOTES
Coordination of Care  1. Have you been to the ER, urgent care clinic since your last visit? Hospitalized since your last visit? No    2. Have you seen or consulted any other health care providers outside of the [address] [place] [address] since your last visit? Include any pap smears or colon screening. No    Does the patient need refills? no    Learning Assessment Complete?  yes  Depression Screening complete in the past 12 months? yes    Ashley Mandujano

## 2021-12-06 NOTE — PROGRESS NOTES
: Greta Meier  Patient identification verified with 2 identifiers. Consent: She and/or health care decision maker has provided verbal consent to proceed: Yes   Total Time: minutes: 11-20 minutes  Pursuant to the emergency declaration under the [facility], [facility] authority and the [employer] and [employer] Act, this Virtual Telephone Visit was conducted to reduce the patient's risk of exposure to COVID-19. Assessment/Plan:   Diagnoses and all orders for this visit:    1. Type 2 diabetes mellitus with hyperglycemia, unspecified whether long term insulin use (HCC)  -     insulin NPH (NOVOLIN N, HUMULIN N) 100 unit/mL injection; 14 units sc qhs; if fasting glucoses are > 100 after 4 days, increase to 18 units sc qhs    2. Desire for pregnancy    Sees Dr. Umm Johansen on 2/16/22  Follow-up and Dispositions    · Return for 5 weeks LK F2F (diabetes, planning for pregnancy, contraception removed this week). Increase NPH insulin to 14 units. If fasting glucoses are > 100 after 4 days, increase NPH insulin to 18 units sc qhs At 9:30 to 10 pm.  Eats dinner at 8 pm.  Gets  Dec 18. Discussed how to treat hypoglycemia. Subjective:   Mireya Hyman is a 33 y.o. female evaluated via telephone on 12/6/2021. Chief Complaint   Patient presents with    Follow-up     notes from blood sugar checks, pt states at night her feet feel heavy and nunbness, she states like they feel like they fell asleep. History of Present Illness  Feet feel fine during the day. At night has tingling sensation like she had walked for too long, like a heaviness. This week she will get the contraceptive device out of her arm. Currently she is using 12 units NPH between 9:30 pm and 10 pm before bed, as instructed. Her fasting glucoses have not been lower than 130.   Day Fasting Breakfast  Before    After Lunch  Before       After Dinner  Before       After Before bed   Sun 130   108       Mon 140   120       She has been eating a late lunch. Review of Systems   General: No fever. Eyes: No blurry or double vision. Cardiovascular: No chest pain, dyspnea, or TIAs. Endocrine: No polydipsia or polyphagia. No weight loss. No hypoglycemic symptoms. Respiratory: No shortness of breath. Musculoskeletal: No myalgias. Genitourinary: No polyuria. Neurological: No numbness/tingling/pain in extremities. Extremities: No swelling. Objective:     Latest Lab Result Choices discussed today:   Lab Results   Component Value Date/Time    Hemoglobin A1c 11.1 (H) 10/08/2021 09:30 AM    Glucose 188 (H) 10/08/2021 09:30 AM    Glucose  NF 09/28/2021 01:48 PM    Microalbumin/Creat ratio (mg/g creat) 74 (H) 10/08/2021 09:30 AM    Microalbumin,urine random 10.70 10/08/2021 09:30 AM    LDL, calculated 145.2 (H) 10/08/2021 09:30 AM    Creatinine 0.67 10/08/2021 09:30 AM      Current Outpatient Medications   Medication Sig    insulin NPH (NOVOLIN N, HUMULIN N) 100 unit/mL injection 12 units sc qhs; if fasting glucoses are > 100, increase by 4 units every 4 days    metFORMIN ER (GLUCOPHAGE XR) 500 mg tablet 2 po bid ac breakfast and dinner for diabetes. Neli 2 tab antes de desayuno y 2 tab antes de la jassi para diabetes.  Insulin Syringe-Needle U-100 1/2 mL 30 gauge syrg 1 Syringe by Does Not Apply route nightly. Use as directed    Blood-Glucose Meter monitoring kit Rel[employer] Prime. Needs test strips, lancing device, lancets please. No current facility-administered medications for this visit. No physical exam performed due to telephone visit. I affirm this is a Patient Initiated Episode with a Patient who has not had a related appointment within my department in the past 7 days or scheduled within the next 24 hours. CHESTER Cummings expressed understanding and agreed to this plan.

## 2022-01-11 ENCOUNTER — VIRTUAL VISIT (OUTPATIENT)
Dept: FAMILY MEDICINE CLINIC | Age: 32
End: 2022-01-11

## 2022-01-11 DIAGNOSIS — E11.65 TYPE 2 DIABETES MELLITUS WITH HYPERGLYCEMIA, WITHOUT LONG-TERM CURRENT USE OF INSULIN (HCC): ICD-10-CM

## 2022-01-11 DIAGNOSIS — R03.0 ELEVATED BLOOD PRESSURE READING: Primary | ICD-10-CM

## 2022-01-11 PROCEDURE — 99442 PR PHYS/QHP TELEPHONE EVALUATION 11-20 MIN: CPT | Performed by: NURSE PRACTITIONER

## 2022-01-11 RX ORDER — METFORMIN HYDROCHLORIDE 500 MG/1
TABLET, EXTENDED RELEASE ORAL
Qty: 360 TABLET | Refills: 1 | Status: SHIPPED | OUTPATIENT
Start: 2022-01-11 | End: 2022-09-12 | Stop reason: SDUPTHER

## 2022-01-11 NOTE — PROGRESS NOTES
: Dean Perez  Patient identification verified with 2 identifiers. Consent: She and/or health care decision maker has provided verbal consent to proceed: Yes   Total Time: minutes: 11-20 minutes  Pursuant to the emergency declaration under the [address], [address] [place] [address] and the [employer] and [employer] Act, this Virtual Telephone Visit was conducted to reduce the patient's risk of exposure to COVID-19. Assessment/Plan:   Diagnoses and all orders for this visit:    1. Elevated blood pressure reading    2. Type 2 diabetes mellitus with hyperglycemia, without long-term current use of insulin (HCC)  -     metFORMIN ER (GLUCOPHAGE XR) 500 mg tablet; 2 po bid ac breakfast and dinner for diabetes. Neli 2 tab antes de desayuno y 2 tab antes de la jassi para diabetes. -     METABOLIC PANEL, COMPREHENSIVE; Future  -     CBC WITH AUTOMATED DIFF; Future  -     HEMOGLOBIN A1C WITH EAG; Future  -     MICROALBUMIN, UR, RAND W/ MICROALB/CREAT RATIO; Future  -     TSH 3RD GENERATION; Future    3. Type 2 diabetes mellitus with hyperglycemia, unspecified whether long term insulin use (HCC)  -     insulin NPH (NOVOLIN N, HUMULIN N) 100 unit/mL injection; 18 units sc qhs      Follow-up and Dispositions    · Return for nonfasting labs week of 22 (if lab appt not available, needs F2F any provider nonfasting labs). increase insulin to 18 units before bed  Continue metformin      Subjective:   Josey Contreras is a 27 y.o. female evaluated via telephone on 2022. Chief Complaint   Patient presents with    Diabetes     and planning pregnancy from Office visit 10/26/2021    Medication Refill     Metformin   /101. HR 93  She cooks at home. Reduce use of Mirna/Nancy sodium cubes.   Reschedule labs    Dr. Rob Camarena 21  Fastin, 110, 120, 124, 135  At night: 140, 145, 160, 172  Using 14 units before bed  LMP She had the implant removed last year, Dec 11. Her blood pressure was 140s. Her last period was December 2020. History of Present Illness    Review of Systems   General: No fever. Eyes: No blurry or double vision. Cardiovascular: No chest pain, dyspnea, or TIAs. Endocrine: No polydipsia or polyphagia. No weight loss. No hypoglycemic symptoms. Respiratory: No shortness of breath. Musculoskeletal: No myalgias. Genitourinary: No polyuria. Neurological: No numbness/tingling/pain in extremities. Extremities: No swelling. Objective:     Latest Lab Result Choices discussed today:   Lab Results   Component Value Date/Time    Hemoglobin A1c 11.1 (H) 10/08/2021 09:30 AM    Glucose 188 (H) 10/08/2021 09:30 AM    Glucose  NF 09/28/2021 01:48 PM    Microalbumin/Creat ratio (mg/g creat) 74 (H) 10/08/2021 09:30 AM    Microalbumin,urine random 10.70 10/08/2021 09:30 AM    LDL, calculated 145.2 (H) 10/08/2021 09:30 AM    Creatinine 0.67 10/08/2021 09:30 AM      Current Outpatient Medications   Medication Sig    metFORMIN ER (GLUCOPHAGE XR) 500 mg tablet 2 po bid ac breakfast and dinner for diabetes. Neli 2 tab antes de desayuno y 2 tab antes de la jassi para diabetes.  insulin NPH (NOVOLIN N, HUMULIN N) 100 unit/mL injection 18 units sc qhs    Insulin Syringe-Needle U-100 1/2 mL 30 gauge syrg 1 Syringe by Does Not Apply route nightly. Use as directed    Blood-Glucose Meter monitoring kit Rel[employer] Prime. Needs test strips, lancing device, lancets please. No current facility-administered medications for this visit. No physical exam performed due to telephone visit. I affirm this is a Patient Initiated Episode with a Patient who has not had a related appointment within my department in the past 7 days or scheduled within the next 24 hours. Peter Murray, CHESTER Barton expressed understanding and agreed to this plan.

## 2022-01-11 NOTE — PROGRESS NOTES
Patient states she is at work  and would like for provider to call ahead of appointment time. Patient does have access to vital sign equipment and BP right now is: /101. HR 93  Coordination of Care  1. Have you been to the ER, urgent care clinic since your last visit? Hospitalized since your last visit? No    2. Have you seen or consulted any other health care providers outside of the [address] [place] [address] since your last visit? Include any pap smears or colon screening. No    Does the patient need refills? YES    Learning Assessment Complete?  yes  Depression Screening complete in the past 12 months? yes

## 2022-01-28 ENCOUNTER — TELEPHONE (OUTPATIENT)
Dept: FAMILY MEDICINE CLINIC | Age: 32
End: 2022-01-28

## 2022-01-28 NOTE — TELEPHONE ENCOUNTER
With the assistance of Arabic interpretor Tana Sotelo, patient was contacted by telephone. Advised patient of bloodwork ordered by Amy Gibbons NP & that Dr. Cailin Sutherland is requesting the same bloodwork. Reminded patient of her visit with Salome Balderrama NP on 2/9 at 8:50. Advised patient to have bloodwork done at that visit since appt with Dr. Cailin Sutherland is the week after. Patient indicated understanding & appreciated the telephone call.

## 2022-02-09 ENCOUNTER — HOSPITAL ENCOUNTER (OUTPATIENT)
Dept: LAB | Age: 32
Discharge: HOME | End: 2022-02-09

## 2022-02-09 ENCOUNTER — OFFICE VISIT (OUTPATIENT)
Dept: FAMILY MEDICINE CLINIC | Age: 32
End: 2022-02-09

## 2022-02-09 VITALS
BODY MASS INDEX: 40.02 KG/M2 | TEMPERATURE: 97.9 F | OXYGEN SATURATION: 100 % | SYSTOLIC BLOOD PRESSURE: 139 MMHG | HEART RATE: 97 BPM | HEIGHT: 66 IN | DIASTOLIC BLOOD PRESSURE: 90 MMHG | WEIGHT: 249 LBS

## 2022-02-09 DIAGNOSIS — N93.9 ABNORMAL UTERINE BLEEDING (AUB): ICD-10-CM

## 2022-02-09 DIAGNOSIS — E11.65 TYPE 2 DIABETES MELLITUS WITH HYPERGLYCEMIA, WITHOUT LONG-TERM CURRENT USE OF INSULIN (HCC): Primary | ICD-10-CM

## 2022-02-09 DIAGNOSIS — E11.65 TYPE 2 DIABETES MELLITUS WITH HYPERGLYCEMIA, WITHOUT LONG-TERM CURRENT USE OF INSULIN (HCC): ICD-10-CM

## 2022-02-09 LAB
GLUCOSE POC: NORMAL MG/DL
HCG URINE, QL. (POC): NEGATIVE
VALID INTERNAL CONTROL?: YES

## 2022-02-09 PROCEDURE — 82962 GLUCOSE BLOOD TEST: CPT | Performed by: PHYSICIAN ASSISTANT

## 2022-02-09 PROCEDURE — 3051F HG A1C>EQUAL 7.0%<8.0%: CPT | Performed by: PHYSICIAN ASSISTANT

## 2022-02-09 PROCEDURE — 81025 URINE PREGNANCY TEST: CPT | Performed by: PHYSICIAN ASSISTANT

## 2022-02-09 PROCEDURE — 99213 OFFICE O/P EST LOW 20 MIN: CPT | Performed by: PHYSICIAN ASSISTANT

## 2022-02-09 NOTE — PROGRESS NOTES
Coy Siu seen at d/c, given AVS and reviewed today's visit with patient, along with instructions on when to come back. I have reviewed the provider's instructions with the patient, answering all questions to her satisfaction. Patient verbalized understanding. Patient is due for COVID-19 vaccine (3rd dose), patient is aware and would like to receive it at a different time.   Luis Escobedo RN

## 2022-02-09 NOTE — PROGRESS NOTES
Coordination of Care  1. Have you been to the ER, urgent care clinic since your last visit? Hospitalized since your last visit? No    2. Have you seen or consulted any other health care providers outside of the [address] [place] [address] since your last visit? Include any pap smears or colon screening. No    Does the patient need refills? NO    Learning Assessment Complete?  yes  Depression Screening complete in the past 12 months? yes     Results for orders placed or performed in visit on 02/09/22   AMB POC GLUCOSE BLOOD, BY GLUCOSE MONITORING DEVICE   Result Value Ref Range    Glucose POC F-117 MG/DL   AMB POC URINE PREGNANCY TEST, VISUAL COLOR COMPARISON   Result Value Ref Range    VALID INTERNAL CONTROL POC Yes     HCG urine, Ql. (POC) Negative Negative

## 2022-02-09 NOTE — PROGRESS NOTES
Assessment/Plan:    Diagnoses and all orders for this visit:    1. Type 2 diabetes mellitus with hyperglycemia, without long-term current use of insulin (HCC)  -     AMB POC GLUCOSE BLOOD, BY GLUCOSE MONITORING DEVICE  -     AMB POC URINE PREGNANCY TEST, VISUAL COLOR COMPARISON    2. Abnormal uterine bleeding (AUB)  -     HCG QL SERUM; Future        Follow-up and Dispositions    · Return in about 1 week (around 2/16/2022). SLADE Villarreal expressed understanding of this plan. An AVS was printed and given to the patient.      ----------------------------------------------------------------------    Chief Complaint   Patient presents with    Diabetes     and HTN f/up        History of Present Illness:  Pt presents today for f/up on diabetes - she is due for labs today as she has her first appt with endocrinology next week. I am meeting the pt for the first time today  She desires pregnancy. She had her implanon removed a few months ago and has not had a period since the removal in 11/21. She was not having normal periods for the 1.5 years that she had the implanon inserted. She did start prenatal vitamins when she got the implanon removed. She is not on medication for her bp. She has been trying very hard to make sound lifestyle changes, including diet changes. She wants to be as healthy as she can for a future pregnancy. She has taken home UPT which were negative and UPT in clinic today is also neg. Will confirm this with serum testing    She will meet Dr Annabella Love next week. No change to her DM regime today.  Her blood glucose today is improved with the recent lifestyle changes    Lab Results   Component Value Date/Time    Glucose 188 (H) 10/08/2021 09:30 AM    Glucose POC F-117 02/09/2022 09:13 AM           Past Medical History:   Diagnosis Date    Obesity        Current Outpatient Medications   Medication Sig Dispense Refill    metFORMIN ER (GLUCOPHAGE XR) 500 mg tablet 2 po bid ac breakfast and dinner for diabetes. Neli 2 tab antes de desayuno y 2 tab antes de la jassi para diabetes. 360 Tablet 1    insulin NPH (NOVOLIN N, HUMULIN N) 100 unit/mL injection 18 units sc qhs 10 mL 1    Insulin Syringe-Needle U-100 1/2 mL 30 gauge syrg 1 Syringe by Does Not Apply route nightly. Use as directed 100 Each 0    Blood-Glucose Meter monitoring kit Reli-On Prime. Needs test strips, lancing device, lancets please.  1 Kit 0       No Known Allergies    Social History     Tobacco Use    Smoking status: Former Smoker     Years: 2.00     Types: Cigarettes     Quit date: 2017     Years since quittin.4    Smokeless tobacco: Never Used    Tobacco comment: social   Substance Use Topics    Alcohol use: Not Currently     Alcohol/week: 1.0 standard drink     Types: 1 Cans of beer per week     Comment: socially-drinks beer    Drug use: Never       Family History   Problem Relation Age of Onset    Stroke Mother     Microhematuria Father     Heart Attack Father 61    Hypertension Father     Heart Attack Maternal Grandmother     Diabetes Maternal Grandfather        Physical Exam:     Visit Vitals  BP (!) 139/90 (BP 1 Location: Right arm, BP Patient Position: Sitting)   Pulse 97   Temp 97.9 °F (36.6 °C) (Temporal)   Ht 5' 6.06\" (1.678 m)   Wt 249 lb (112.9 kg)   SpO2 100%   BMI 40.11 kg/m²       A&Ox3  WDWN NAD  Respirations normal and non labored

## 2022-02-09 NOTE — PATIENT INSTRUCTIONS
Sangrado uterino anormal: Instrucciones de cuidado  Abnormal Uterine Bleeding: Care Instructions  Instrucciones de cuidado    El sangrado uterino anormal (AUB, por alan siglas en inglés) es un sangrado irregular del útero que dura más o es más intenso de lo normal o que no ocurre en el momento habitual para usted. A veces, se debe a cambios en los niveles hormonales. También puede estar causado por crecimientos en el útero, tales barby fibromas o pólipos. A veces no se puede encontrar la causa. Podría tener mucho sangrado cuando no está esperando russell período. Russell médico puede sugerirle tadeo prueba de embarazo si daron que está Corazon Dally. La atención de seguimiento es tadeo parte clave de russell tratamiento y seguridad. Asegúrese de hacer y acudir a todas las citas, y llame a russell médico si está teniendo problemas. También es tadeo buena idea saber los resultados de alan exámenes y mantener tadeo lista de los medicamentos que evert. ¿Cómo puede cuidarse en el hogar? · Sea cricket con los medicamentos. [place] los analgésicos (medicamentos para el dolor) exactamente barby le fueron indicados. ? Si el médico le recetó un analgésico, tómelo según las indicaciones. ? Si no está tomando un analgésico recetado, pregúntele a russell médico si puede kevin tin de [employer]. · Podría faltarle payton por la pérdida de zari. Coma tadeo dieta equilibrada con alto contenido de payton y vitamina C. Entre los alimentos ricos en payton se encuentran las arianna marin, los [place], los [place], los frijoles (habichuelas) y las verduras de hojas verdes. Pregúntele a russell médico si necesita kevin pastillas de payton o un multivitamínico.  ¿Cuándo debe pedir ayuda? Llame al 911 en cualquier momento que considere que necesita atención de [place]. Por ejemplo, llame si:    · Se desmayó (perdió el conocimiento).    Llame a russell médico ahora mismo o busque atención médica inmediata si:    · Tiene dolor repentino e intenso en el abdomen o la pelvis.     · Tiene sangrado vaginal intenso. Empapa alan toallas sanitarias o tampones habituales cada hora zay 2 o más horas.     · Siente mareos o aturdimiento, o que está a punto de desmayarse. Preste especial atención a los cambios en russell ana lilia y asegúrese de comunicarse con russell médico si:    · Tiene un dolor nuevo en el abdomen o la pelvis.     · Tiene fiebre.     · El sangrado empeora o dura más de 1 semana.     · Piensa que podría estar embarazada. ¿Dónde puede encontrar más información en inglés? Vaya a [email or web address]  Reshma [id number] en la búsqueda para aprender más acerca de \"Sangrado uterino anormal: Instrucciones de cuidado. \"  Revisado: 11 febrero, 2021               Versión del contenido: 13.0  © 6738-6725 Healthwise, Incorporated. Las instrucciones de cuidado fueron adaptadas bajo licencia por [employer] (which disclaims liability or warranty for this information). Si uscynthia tiene [place] afección médica o sobre estas instrucciones, siempre pregunte a russell profesional de ana lilia. Healthwise, Incorporated niega toda garantía o responsabilidad por russell uso de esta información. Aprenda sobre la planificación de un futuro embarazo  Learning About Planning for Future Pregnancy  ¿Cómo puede planificar para un embarazo? Incluso antes de [facility], chavo puede ayudar a que russell embarazo sea lo más saludable posible. [place] las siguientes medidas:  · [place] a un médico o a tadeo enfermera partera certificada para hacerse un examen. Hable acerca de los medicamentos, vitaminas y hierbas que evert. Hable acerca de cualquier problema de ana lilia o inquietud que tenga. · No tome medicamentos antiinflamatorios no esteroideos (YULIA) a menos que russell médico lo autorice. Ejemplos de estos son el ibuprofeno y la aspirina. Pueden elevar russell riesgo de aborto espontáneo.   · [place] diariamente algún multivitamínico o tadeo vitamina prenatal. Asegúrese de que contenga ácido fólico. [place] reducirá la probabilidad de tener un bebé con tadeo anomalía congénita (de nacimiento). · Lleve un registro de russell ciclo menstrual. [place] es tadeo buena idea por Nalani Curling. Ina [place] a saber cuál es el mejor momento para tratar de Morgan Malhotra. Y puede ayudar a russell médico o partera a calcular la fecha del parto y cómo está creciendo russell bebé. · [place] decisiones saludables. [place] анна. Evite la cafeína. O reduzca el consumo y solo tome 1 taza de café o té al día. Evite el alcohol, los cigarrillos, la marihuana y las drogas [address] que aprueben russell médico o russell partera. · Lavinia bastante ejercicio. Un cuerpo amaya hará que el embarazo y el parto dawn más fáciles. También la ayudará a recuperarse después del parto. Y el ejercicio puede ayudar a mejorar russell estado de ánimo. ¿Qué otros exámenes o pruebas debería hacerse? Antes de tratar de quedar embarazada, [place] cualquier otro problema de ana lilia que pueda [place]ia [place]. · Si tiene diabetes o presión arterial amanda, o si está obesa, hable con russell médico antes de quedar embarazada. Juntos podrán hacer un plan sobre cómo controlar estos problemas de [place]. · Hable con rsusell médico acerca de cualquier medicamento que usted tome. [place] si es seguro seguir tomándolo zay el Christelle Beam. · Póngase cualquier vacuna que necesite. Pueden ayudar a prevenir anomalías congénitas, un aborto espontáneo o que nazca muerto el bebé debido a infecciones barby la rubéola o el sarampión. Pregúntele a russell médico cuánto tiempo debe esperar después de haberse aplicado tadeo vacuna antes de intentar Morgan Malhotra. · Hable con russell médico acerca de si debe hacerse pruebas de detección de enfermedades hereditarias (afecciones genéticas). Estas enfermedades incluyen las siguientes:  ? La fibrosis quística. ? La enfermedad de células falciformes. ? La enfermedad de [place].   Si piensa que pudiera estar embarazada  · Puede utilizar tadeo prueba de Christelle Beam en russell hogar a partir del primer día en que no le llegó russell período menstrual.  · Tan pronto barby sepa que está Karina Lazcano, dorie tadeo abdias con russell médico o enfermera partera certificada. Russell primera visita prenatal proporcionará información que puede ser [place] para verificar si hay problemas a medida que avanza russell embarazo. ¿Dónde puede encontrar más información en inglés? Sydnee Laughlin a http://[email or web address].[email or web address]  Reshma T705 en la búsqueda para aprender más acerca de \"Aprenda sobre la planificación de un futuro embarazo. \"  Revisado: 16 junio, 2021               Versión del contenido: 13.0  © 4672-1825 Healthwise, [employer]. Las instrucciones de cuidado fueron adaptadas bajo licencia por Good [employer] (which disclaims liability or warranty for this information). Si usted tiene [place] afección médica o sobre estas instrucciones, siempre pregunte a russell profesional de ana lilia. [employer], [employer] niega toda garantía o responsabilidad por russell uso de esta información.

## 2022-02-10 LAB
ALBUMIN SERPL-MCNC: 3.6 G/DL (ref 3.5–5)
ALBUMIN/GLOB SERPL: 1.1 {RATIO} (ref 1.1–2.2)
ALP SERPL-CCNC: 123 U/L (ref 45–117)
ALT SERPL-CCNC: 33 U/L (ref 12–78)
ANION GAP SERPL CALC-SCNC: 3 MMOL/L (ref 5–15)
AST SERPL-CCNC: 15 U/L (ref 15–37)
BASOPHILS # BLD: 0.1 K/UL (ref 0–0.1)
BASOPHILS NFR BLD: 1 % (ref 0–1)
BILIRUB SERPL-MCNC: 0.4 MG/DL (ref 0.2–1)
BUN SERPL-MCNC: 11 MG/DL (ref 6–20)
BUN/CREAT SERPL: 18 (ref 12–20)
CALCIUM SERPL-MCNC: 9.1 MG/DL (ref 8.5–10.1)
CHLORIDE SERPL-SCNC: 104 MMOL/L (ref 97–108)
CO2 SERPL-SCNC: 29 MMOL/L (ref 21–32)
CREAT SERPL-MCNC: 0.6 MG/DL (ref 0.55–1.02)
CREAT UR-MCNC: 150 MG/DL
DIFFERENTIAL METHOD BLD: ABNORMAL
EOSINOPHIL # BLD: 0.2 K/UL (ref 0–0.4)
EOSINOPHIL NFR BLD: 2 % (ref 0–7)
ERYTHROCYTE [DISTWIDTH] IN BLOOD BY AUTOMATED COUNT: 15.7 % (ref 11.5–14.5)
EST. AVERAGE GLUCOSE BLD GHB EST-MCNC: 163 MG/DL
GLOBULIN SER CALC-MCNC: 3.4 G/DL (ref 2–4)
GLUCOSE SERPL-MCNC: 114 MG/DL (ref 65–100)
HBA1C MFR BLD: 7.3 % (ref 4–5.6)
HCG SERPL QL: NEGATIVE
HCT VFR BLD AUTO: 38.6 % (ref 35–47)
HGB BLD-MCNC: 12 G/DL (ref 11.5–16)
IMM GRANULOCYTES # BLD AUTO: 0 K/UL (ref 0–0.04)
IMM GRANULOCYTES NFR BLD AUTO: 0 % (ref 0–0.5)
LYMPHOCYTES # BLD: 3.1 K/UL (ref 0.8–3.5)
LYMPHOCYTES NFR BLD: 38 % (ref 12–49)
MCH RBC QN AUTO: 24.3 PG (ref 26–34)
MCHC RBC AUTO-ENTMCNC: 31.1 G/DL (ref 30–36.5)
MCV RBC AUTO: 78.1 FL (ref 80–99)
MICROALBUMIN UR-MCNC: 10.5 MG/DL
MICROALBUMIN/CREAT UR-RTO: 70 MG/G (ref 0–30)
MONOCYTES # BLD: 0.6 K/UL (ref 0–1)
MONOCYTES NFR BLD: 7 % (ref 5–13)
NEUTS SEG # BLD: 4.2 K/UL (ref 1.8–8)
NEUTS SEG NFR BLD: 52 % (ref 32–75)
NRBC # BLD: 0 K/UL (ref 0–0.01)
NRBC BLD-RTO: 0 PER 100 WBC
PLATELET # BLD AUTO: 358 K/UL (ref 150–400)
PMV BLD AUTO: 10.3 FL (ref 8.9–12.9)
POTASSIUM SERPL-SCNC: 4.3 MMOL/L (ref 3.5–5.1)
PROT SERPL-MCNC: 7 G/DL (ref 6.4–8.2)
RBC # BLD AUTO: 4.94 M/UL (ref 3.8–5.2)
SODIUM SERPL-SCNC: 136 MMOL/L (ref 136–145)
TSH SERPL DL<=0.05 MIU/L-ACNC: 2.29 UIU/ML (ref 0.36–3.74)
WBC # BLD AUTO: 8.2 K/UL (ref 3.6–11)

## 2022-02-10 PROCEDURE — 85025 COMPLETE CBC W/AUTO DIFF WBC: CPT

## 2022-02-10 PROCEDURE — 84703 CHORIONIC GONADOTROPIN ASSAY: CPT

## 2022-02-10 PROCEDURE — 84443 ASSAY THYROID STIM HORMONE: CPT

## 2022-02-10 PROCEDURE — 80053 COMPREHEN METABOLIC PANEL: CPT

## 2022-02-10 PROCEDURE — 83036 HEMOGLOBIN GLYCOSYLATED A1C: CPT

## 2022-02-10 PROCEDURE — 82043 UR ALBUMIN QUANTITATIVE: CPT

## 2022-02-10 NOTE — PROGRESS NOTES
T/c to pt to let her know that her serum hcg is negative. She is aware and I did mention the wonderful reduction in her a1c from > 11 to 7.3.  I did not discuss her other labs as she has f/up next week with Dr Courtney Dill

## 2022-02-16 ENCOUNTER — OFFICE VISIT (OUTPATIENT)
Dept: FAMILY MEDICINE CLINIC | Age: 32
End: 2022-02-16

## 2022-02-16 VITALS
TEMPERATURE: 97.9 F | HEART RATE: 98 BPM | WEIGHT: 254 LBS | DIASTOLIC BLOOD PRESSURE: 88 MMHG | HEIGHT: 67 IN | OXYGEN SATURATION: 99 % | BODY MASS INDEX: 39.87 KG/M2 | SYSTOLIC BLOOD PRESSURE: 133 MMHG

## 2022-02-16 DIAGNOSIS — E11.65 TYPE 2 DIABETES MELLITUS WITH HYPERGLYCEMIA, WITHOUT LONG-TERM CURRENT USE OF INSULIN (HCC): Primary | ICD-10-CM

## 2022-02-16 DIAGNOSIS — E78.5 HYPERLIPIDEMIA LDL GOAL <100: ICD-10-CM

## 2022-02-16 PROCEDURE — 99204 OFFICE O/P NEW MOD 45 MIN: CPT | Performed by: INTERNAL MEDICINE

## 2022-02-16 PROCEDURE — 3051F HG A1C>EQUAL 7.0%<8.0%: CPT | Performed by: INTERNAL MEDICINE

## 2022-02-16 NOTE — PATIENT INSTRUCTIONS
1) Russell A1c (examen de 3 meses de azucar) esta major 7.3% y prefiero que el nivel es menos de 6.5%. 2) Evert 22 unidades de insulina antes de dormir y si russell azucar esta menos de 95 en la manana despues de 4 conti, entonce sigue tomando 22 unidades. Si russell azucar esta mas de 95 despues de 4 conti, entonces evert 24 unidades y [address] russell dosis de insulin por 2 unidades cada 4 conti hasta russell azucar esta menos de 95.      3) [place] russell azucar 2 horas despues de comer tin de alan comidas y si russell azucar esta menos de 140, entonces no necesita insulina en la manana. Trinh si russell nivel es mas de 140 2 or mas veces en tadeo semana, entonces evert NPH 10 unidades en la manana y puede aumentar por 2 unidades cada 4 conti si russell azucar esta mas de 140 2 horas de despues de comer.

## 2022-02-16 NOTE — PROGRESS NOTES
Chief Complaint   Patient presents with    Diabetes     follow-up     History of Present Illness: Bryson White is a 27 y.o. female who is a new patient for evaluation of diabetes. Was diagnosed with diabetes in 9/21 and was started on metformin  mg 2 tabs bid and then NPH was added at 10 units and increased up to 14 units and then 18 units at bedtime as of last month. The lowest sugar she has seen is 115 in the morning and at bedtime is 140-150 and has seen 150-160s in the middle of the day. Most recent Hgb A1c was 7.3% in 2/21 down from 11.1% in 10/21. She is trying to gain better control with hopes of conceiving in the future. A typical day is as follows:  - breakfast: eggs, rare cereal  - lunch: chicken and vegetables and fish and meat, 1 tortilla, small serving of rice or beans  - dinner: similar to lunch  - beverages: water, no soda or juice, occ milk  Exercise consists of walking 30 min 3 days a week. No history of vascular disease. No history of retinopathy, neuropathy, or nephropathy. 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  metFORMIN ER (GLUCOPHAGE XR) 500 mg tablet 2 po bid ac breakfast and dinner for diabetes. Neli 2 tab antes de desayuno y 2 tab antes de la jassi para diabetes.  insulin NPH (NOVOLIN N, HUMULIN N) 100 unit/mL injection 18 units sc qhs    Insulin Syringe-Needle U-100 1/2 mL 30 gauge syrg 1 Syringe by Does Not Apply route nightly. Use as directed    Blood-Glucose Meter monitoring kit Reli-On Prime. Needs test strips, lancing device, lancets please. No current facility-administered medications for this visit.      No Known Allergies Family History   Problem Relation Age of Onset    Stroke Mother     Microhematuria Father     Heart Attack Father 61    Hypertension Father     Heart Attack Maternal Grandmother     Diabetes Maternal Grandfather      Social History     Socioeconomic History    Marital status: SINGLE     Spouse name: Not on file    Number of children: Not on file    Years of education: Not on file    Highest education level: Not on file   Occupational History    Not on file   Tobacco Use    Smoking status: Former Smoker     Years: 2.00     Types: Cigarettes     Quit date: 2017     Years since quittin.4    Smokeless tobacco: Never Used    Tobacco comment: social   Substance and Sexual Activity    Alcohol use: Not Currently     Alcohol/week: 1.0 standard drink     Types: 1 Cans of beer per week     Comment: socially-drinks beer    Drug use: Never    Sexual activity: Yes     Partners: Male     Birth control/protection: Condom   Other Topics Concern    Not on file   Social History Narrative    [place] with her  and 17 yo son. Originally from [place]. Moved to [address] [place] [address] [address] in 2018. Not currently working. Social Determinants of Health     Financial Resource Strain:     Difficulty of Paying Living Expenses: Not on file   Food Insecurity:     Worried About Running Out of Food in the Last Year: Not on file    Cora of Food in the Last Year: Not on file   Transportation Needs:     Lack of Transportation (Medical): Not on file    Lack of Transportation (Non-Medical):  Not on file   Physical Activity:     Days of Exercise per Week: Not on file    Minutes of Exercise per Session: Not on file   Stress:     Feeling of Stress : Not on file   Social Connections:     Frequency of Communication with Friends and Family: Not on file    Frequency of Social Gatherings with Friends and Family: Not on file    Attends Restorationism Services: Not on file    Active Member of Clubs or Organizations: Not on file   [address] or Organization Meetings: Not on file    Marital Status: Not on file   Intimate Partner Violence:     Fear of Current or Ex-Partner: Not on file    Emotionally Abused: Not on file    Physically Abused: Not on file    Sexually Abused: Not on file   Housing Stability:     Unable to Pay for Housing in the Last Year: Not on file    Number of [place] in the Last Year: Not on file    Unstable Housing in the Last Year: Not on file     Review of Systems: per HPI    Physical Examination:  Blood pressure 133/88, pulse 98, temperature 97.9 °F (36.6 °C), temperature source Temporal, height 5' 6.93\" (1.7 m), weight 254 lb (115.2 kg), SpO2 99 %.   - General: pleasant, no distress, good eye contact  - HEENT: no exopthalmos, no periorbital edema, no scleral/conjunctival injection, EOMI, no lid lag or stare  - Neck: supple, no thyromegaly, masses, lymph nodes, or carotid bruits, no supraclavicular or dorsocervical fat pads  - Cardiovascular: regular, normal rate, normal S1 and S2, no murmurs/rubs/gallops,  - Respiratory: clear to auscultation bilaterally  - Gastrointestinal: soft, nontender, nondistended, no masses, no hepatosplenomegaly  - Musculoskeletal: no proximal muscle weakness in upper or lower extremities  - Integumentary: no acanthosis nigricans, no rashes, no edema,  - Neurological: grossly intact  - Psychiatric: normal mood and affect    Diabetic foot exam:     Left Foot:   Visual Exam: callous - mild   Pulse DP: 2+ (normal)   Filament test: normal sensation    Vibratory sensation: normal      Right Foot:   Visual Exam: callous - mild   Pulse DP: 2+ (normal)   Filament test: normal sensation    Vibratory sensation: normal          Data Reviewed:   Component      Latest Ref Rng & Units 2/10/2022 10/8/2021   WBC      3.6 - 11.0 K/uL 8.2 9.5   RBC      3.80 - 5.20 M/uL 4.94 5.46 (H)   HGB      11.5 - 16.0 g/dL 12.0 12.4   HCT      35.0 - 47.0 % 38.6 40.8   MCV      80.0 - 99.0 FL 78.1 (L) 74.7 (L)   MCH      26.0 - 34.0 PG 24.3 (L) 22.7 (L)   MCHC      30.0 - 36.5 g/dL 31.1 30.4   RDW      11.5 - 14.5 % 15.7 (H) 18.3 (H)   PLATELET      834 - 183 K/uL 358 357   MPV      8.9 - 12.9 FL 10.3 10.6   NRBC      0  WBC 0.0 0.0   ABSOLUTE NRBC      0.00 - 0.01 K/uL 0.00 0.00   NEUTROPHILS      32 - 75 % 52 56   LYMPHOCYTES      12 - 49 % 38 34   MONOCYTES      5 - 13 % 7 7   EOSINOPHILS      0 - 7 % 2 2   BASOPHILS      0 - 1 % 1 1   IMMATURE GRANULOCYTES      0.0 - 0.5 % 0 0   ABS. NEUTROPHILS      1.8 - 8.0 K/UL 4.2 5.3   ABS. LYMPHOCYTES      0.8 - 3.5 K/UL 3.1 3.2   ABS. MONOCYTES      0.0 - 1.0 K/UL 0.6 0.6   ABS. EOSINOPHILS      0.0 - 0.4 K/UL 0.2 0.2   ABS. BASOPHILS      0.0 - 0.1 K/UL 0.1 0.1   ABS. IMM. GRANS.      0.00 - 0.04 K/UL 0.0 0.0   DF       AUTOMATED AUTOMATED   Sodium      136 - 145 mmol/L 136 135 (L)   Potassium      3.5 - 5.1 mmol/L 4.3 4.4   Chloride      97 - 108 mmol/L 104 102   CO2      21 - 32 mmol/L 29 26   Anion gap      5 - 15 mmol/L 3 (L) 7   Glucose      65 - 100 mg/dL 114 (H) 188 (H)   BUN      6 - 20 MG/DL 11 9   Creatinine      0.55 - 1.02 MG/DL 0.60 0.67   BUN/Creatinine ratio      12 - 20   18 13   GFR est AA      >60 ml/min/1.73m2 >60 >60   GFR est non-AA      >60 ml/min/1.73m2 >60 >60   Calcium      8.5 - 10.1 MG/DL 9.1 9.5   Bilirubin, total      0.2 - 1.0 MG/DL 0.4 0.4   ALT      12 - 78 U/L 33 23   AST      15 - 37 U/L 15 13 (L)   Alk.  phosphatase      45 - 117 U/L 123 (H) 171 (H)   Protein, total      6.4 - 8.2 g/dL 7.0 7.5   Albumin      3.5 - 5.0 g/dL 3.6 3.7   Globulin      2.0 - 4.0 g/dL 3.4 3.8   A-G Ratio      1.1 - 2.2   1.1 1.0 (L)   Cholesterol, total      <200 MG/DL  227 (H)   Triglyceride      <150 MG/DL  79   HDL Cholesterol      MG/DL  66   LDL, calculated      0 - 100 MG/DL  145.2 (H)   VLDL, calculated      MG/DL  15.8   CHOL/HDL Ratio      0.0 - 5.0    3.4   Microalbumin,urine random      MG/DL 10.50 10.70   Creatinine, urine      mg/dL 150.00 144.00   Microalbumin/Creat. Ratio      0 - 30 mg/g 70 (H) 74 (H)   Hemoglobin A1c, (calculated)      4.0 - 5.6 % 7.3 (H) 11.1 (H)   Est. average glucose      mg/dL 163 272   TSH      0.36 - 3.74 uIU/mL 2.29 1.68       Assessment/Plan:   1. Type 2 diabetes mellitus with hyperglycemia, without long-term current use of insulin ([address]): her most recent Hgb A1c was 7.3% in 2/22 down (1st visit with me) down from 11.1% in 10/21 and was just diagnosed with DM on 9/28/21. She desires pregnancy and I discussed that insulin and metformin is the safest regimen to use at this time. She needs better control of her fasting sugars so gave her a plan of how to titrate up her bedtime NPH to target a fasting sugar < 95 and will check some 2 hour after meal readings to see if she needs a dose in the morning  - increase NPH to 22 units at bedtime  - cont metformin  mg 2 tabs bid  - check bs fasting and 2 hours after one of her meals per day  - microalbumin 74 in 10/21 down to 70 in 2/22--may need ACE in the future but will hold while considering pregnancy  - foot exam done 2/22  - check Hgb A1c, cmp, and microalbumin prior to next visit         2. Hyperlipidemia LDL goal <100:  in 10/21 when diagnosed with diabetes and lower A1c should improve her control. Will hold on any statins while trying to conceive  - check lipids prior to next visit          Patient Instructions   1) Russell A1c (examen de 3 meses de azucar) esta major 7.3% y prefiero que el nivel es menos de 6.5%. 2) Neli 22 unidades de insulina antes de dormir y si russell azucar esta menos de 95 en la manana despues de 4 conti, entonce sigue tomando 22 unidades.   Si russell azucar esta mas de 95 despues de 4 conti, entonces enli 24 unidades y [address] russell dosis de insulin por 2 unidades cada 4 conti hasta russell azucar esta menos de 95.      3) [place] russell azucar 2 horas despues de comer tin de alan comidas y si russell azucar esta menos de 140, entonces no necesita insulina en la manana. Trinh si russell nivel es mas de 140 2 or mas veces en tadeo semana, entonces evert NPH 10 unidades en la manana y puede aumentar por 2 unidades cada 4 conti si russell azucar esta mas de 140 2 horas de despues de comer.       Follow-up and Dispositions    · Return 6/15/22 at 2:20pm.             Copy sent to:  Darrius Hendrickson NP via [employer] [facility]

## 2022-02-16 NOTE — PROGRESS NOTES
AVS printed & reviewed with patient. Follow-up with Dr. Regis Segal scheduled for 6/15 at 2:20pm.  Patient indicated understanding & appreciated the service.

## 2022-03-18 PROBLEM — E66.9 OBESITY: Status: ACTIVE | Noted: 2020-07-24

## 2022-03-19 PROBLEM — E11.65 TYPE 2 DIABETES MELLITUS WITH HYPERGLYCEMIA, WITHOUT LONG-TERM CURRENT USE OF INSULIN (HCC): Status: ACTIVE | Noted: 2021-10-07

## 2022-06-10 ENCOUNTER — HOSPITAL ENCOUNTER (OUTPATIENT)
Dept: LAB | Age: 32
Discharge: HOME | End: 2022-06-10

## 2022-06-10 ENCOUNTER — LAB ONLY (OUTPATIENT)
Dept: FAMILY MEDICINE CLINIC | Age: 32
End: 2022-06-10

## 2022-06-10 DIAGNOSIS — E11.65 TYPE 2 DIABETES MELLITUS WITH HYPERGLYCEMIA, WITHOUT LONG-TERM CURRENT USE OF INSULIN (HCC): ICD-10-CM

## 2022-06-10 DIAGNOSIS — E78.5 HYPERLIPIDEMIA LDL GOAL <100: ICD-10-CM

## 2022-06-10 LAB
ALBUMIN SERPL-MCNC: 3.8 G/DL (ref 3.5–5)
ALBUMIN/GLOB SERPL: 1 {RATIO} (ref 1.1–2.2)
ALP SERPL-CCNC: 115 U/L (ref 45–117)
ALT SERPL-CCNC: 38 U/L (ref 12–78)
ANION GAP SERPL CALC-SCNC: 7 MMOL/L (ref 5–15)
AST SERPL-CCNC: 20 U/L (ref 15–37)
BILIRUB SERPL-MCNC: 0.5 MG/DL (ref 0.2–1)
BUN SERPL-MCNC: 11 MG/DL (ref 6–20)
BUN/CREAT SERPL: 16 (ref 12–20)
CALCIUM SERPL-MCNC: 9.3 MG/DL (ref 8.5–10.1)
CHLORIDE SERPL-SCNC: 105 MMOL/L (ref 97–108)
CHOLEST SERPL-MCNC: 233 MG/DL
CO2 SERPL-SCNC: 27 MMOL/L (ref 21–32)
CREAT SERPL-MCNC: 0.67 MG/DL (ref 0.55–1.02)
CREAT UR-MCNC: 138 MG/DL
EST. AVERAGE GLUCOSE BLD GHB EST-MCNC: 148 MG/DL
GLOBULIN SER CALC-MCNC: 3.7 G/DL (ref 2–4)
GLUCOSE SERPL-MCNC: 96 MG/DL (ref 65–100)
HBA1C MFR BLD: 6.8 % (ref 4–5.6)
HDLC SERPL-MCNC: 60 MG/DL
HDLC SERPL: 3.9 {RATIO} (ref 0–5)
LDLC SERPL CALC-MCNC: 151.2 MG/DL (ref 0–100)
MICROALBUMIN UR-MCNC: 4.92 MG/DL
MICROALBUMIN/CREAT UR-RTO: 36 MG/G (ref 0–30)
POTASSIUM SERPL-SCNC: 3.9 MMOL/L (ref 3.5–5.1)
PROT SERPL-MCNC: 7.5 G/DL (ref 6.4–8.2)
SODIUM SERPL-SCNC: 139 MMOL/L (ref 136–145)
TRIGL SERPL-MCNC: 109 MG/DL (ref ?–150)
VLDLC SERPL CALC-MCNC: 21.8 MG/DL

## 2022-06-10 PROCEDURE — 83036 HEMOGLOBIN GLYCOSYLATED A1C: CPT

## 2022-06-10 PROCEDURE — 80061 LIPID PANEL: CPT

## 2022-06-10 PROCEDURE — 82043 UR ALBUMIN QUANTITATIVE: CPT

## 2022-06-10 PROCEDURE — 80053 COMPREHEN METABOLIC PANEL: CPT

## 2022-06-15 ENCOUNTER — OFFICE VISIT (OUTPATIENT)
Dept: FAMILY MEDICINE CLINIC | Age: 32
End: 2022-06-15

## 2022-06-15 VITALS
BODY MASS INDEX: 40.18 KG/M2 | OXYGEN SATURATION: 99 % | HEART RATE: 100 BPM | HEIGHT: 66 IN | DIASTOLIC BLOOD PRESSURE: 86 MMHG | TEMPERATURE: 98 F | SYSTOLIC BLOOD PRESSURE: 129 MMHG | WEIGHT: 250 LBS

## 2022-06-15 DIAGNOSIS — E11.65 TYPE 2 DIABETES MELLITUS WITH HYPERGLYCEMIA, WITHOUT LONG-TERM CURRENT USE OF INSULIN (HCC): Primary | ICD-10-CM

## 2022-06-15 DIAGNOSIS — E78.5 HYPERLIPIDEMIA LDL GOAL <100: ICD-10-CM

## 2022-06-15 PROCEDURE — 99214 OFFICE O/P EST MOD 30 MIN: CPT | Performed by: INTERNAL MEDICINE

## 2022-06-15 PROCEDURE — 3044F HG A1C LEVEL LT 7.0%: CPT | Performed by: INTERNAL MEDICINE

## 2022-06-15 RX ORDER — MEDROXYPROGESTERONE ACETATE 10 MG/1
TABLET ORAL
COMMUNITY
Start: 2022-06-08 | End: 2022-10-19

## 2022-06-15 NOTE — PROGRESS NOTES
Coordination of Care  1. Have you been to the ER, urgent care clinic since your last visit? Hospitalized since your last visit? No    2. Have you seen or consulted any other health care providers outside of the [address] [place] [address] since your last visit? Include any pap smears or colon screening. Yes When: 6/8/20222-Dr Bal Avalos-Gynecology    Does the patient need refills? YES    Learning Assessment Complete?  yes  Depression Screening complete in the past 12 months? yes

## 2022-06-15 NOTE — PROGRESS NOTES
AVS printed & reviewed with patient with the assistance of Blair Collier, Vita interpretor. Patient is requesting a copy of today's office notes including lab results to take to her OB/GYN Jermain Lowry DO. Copy provided to patient. Patient is aware of her follow-up visit with Dr. Dawn Buck scheduled for 10/19 at 2:00pm & is aware that she will need labs prior to her visit. Patient indicated understanding & appreciated the service. Term birth of female

## 2022-06-15 NOTE — PROGRESS NOTES
Chief Complaint   Patient presents with    Diabetes     f/up    Medication Refill    Other     Pt's OBGYN is makenzie for lab results done 6/10/2022 at fax # [phone]     History of Present Illness: Tonny Bourgeois is a 27 y.o. female here for follow up of diabetes. She increased her NPH to 25-26 units at bedtime but never went higher and her fasting sugars are all in the 110-120s but does have a few over 150. She has checked some after meal readings and all are under 140 aside from a few. Compliant with metformin 2 tabs bid. Her BP was up at her OB/GYN's office but was better today and we have their fax number and Haven Soares will fax this note to them. Current Outpatient Medications   Medication Sig    medroxyPROGESTERone (PROVERA) 10 mg tablet TAKE 1 TABLET BY MOUTH ONCE DAILY FOR 10 DAYS    insulin NPH (NOVOLIN N, HUMULIN N) 100 unit/mL injection 22 units sc qhs--Dose change 02/16/22--updated med list--did not send prescription to the pharmacy    metFORMIN ER (GLUCOPHAGE XR) 500 mg tablet 2 po bid ac breakfast and dinner for diabetes. Neli 2 tab antes de desayuno y 2 tab antes de la jassi para diabetes.  Insulin Syringe-Needle U-100 1/2 mL 30 gauge syrg 1 Syringe by Does Not Apply route nightly. Use as directed    Blood-Glucose Meter monitoring kit Reli-On Prime. Needs test strips, lancing device, lancets please. No current facility-administered medications for this visit. No Known Allergies     Review of Systems: PER HPI    Physical Examination:  Blood pressure 129/86, pulse 100, temperature 98 °F (36.7 °C), height 5' 6.14\" (1.68 m), weight 250 lb (113.4 kg), last menstrual period 06/04/2022, SpO2 99 %.   - General: pleasant, no distress, good eye contact   - Neck: no carotid bruits  - Cardiovascular: regular, normal rate, nl s1 and s2, no m/r/g,   - Respiratory: clear bilaterally  - Integumentary: no edema,   - Psychiatric: normal mood and affect    Data Reviewed:   Component Latest Ref Rng & Units 6/10/2022   Sodium      136 - 145 mmol/L 139   Potassium      3.5 - 5.1 mmol/L 3.9   Chloride      97 - 108 mmol/L 105   CO2      21 - 32 mmol/L 27   Anion gap      5 - 15 mmol/L 7   Glucose      65 - 100 mg/dL 96   BUN      6 - 20 MG/DL 11   Creatinine      0.55 - 1.02 MG/DL 0.67   BUN/Creatinine ratio      12 - 20   16   GFR est AA      >60 ml/min/1.73m2 >60   GFR est non-AA      >60 ml/min/1.73m2 >60   Calcium      8.5 - 10.1 MG/DL 9.3   Bilirubin, total      0.2 - 1.0 MG/DL 0.5   ALT      12 - 78 U/L 38   AST      15 - 37 U/L 20   Alk. phosphatase      45 - 117 U/L 115   Protein, total      6.4 - 8.2 g/dL 7.5   Albumin      3.5 - 5.0 g/dL 3.8   Globulin      2.0 - 4.0 g/dL 3.7   A-G Ratio      1.1 - 2.2   1.0 (L)   Cholesterol, total      <200 MG/ (H)   Triglyceride      <150 MG/   HDL Cholesterol      MG/DL 60   LDL, calculated      0 - 100 MG/.2 (H)   VLDL, calculated      MG/DL 21.8   CHOL/HDL Ratio      0.0 - 5.0   3.9   Microalbumin,urine random      MG/DL 4.92   Creatinine, urine      mg/dL 138.00   Microalbumin/Creat. Ratio      0 - 30 mg/g 36 (H)   Hemoglobin A1c, (calculated)      4.0 - 5.6 % 6.8 (H)   Est. average glucose      mg/dL 148       Assessment/Plan:     1. Type 2 diabetes mellitus with hyperglycemia, without long-term current use of insulin ([facility]): her most recent Hgb A1c was 6.8% in 6/22 down from 7.3% in 2/22 down (1st visit with me) down from 11.1% in 10/21 and was just diagnosed with DM on 9/28/21. She desires pregnancy and I discussed that insulin and metformin is the safest regimen to use at this time. She needs better control of her fasting sugars so gave her a plan of how to titrate up her bedtime NPH to target a fasting sugar < 95 and at this time, I feel she is safe to start conceiving given her A1c is just over 6.5%.   - increase NPH to 30 units at bedtime  - cont metformin  mg 2 tabs bid  - check bs fasting and 2 hours after one of her meals per day  - microalbumin 74 in 10/21 down to 70 in 2/22 and 36 in 6/22--may need ACE in the future but will hold while considering pregnancy  - foot exam done 2/22  - check Hgb A1 and bmp prior to next visit         2. Hyperlipidemia LDL goal <100:  in 10/21 when diagnosed with diabetes and 151 in 6/22. Will hold on any statins while trying to conceive  - check lipids in 2023          Patient Instructions   1) Evert 30 unidades de insulina antes de dormir y si russell azucar esta menos de 95 en la manana despues de 4 conti, entonce sigue tomando 30 unidades. Si russell azucar esta mas de 95 despues de 4 conti, entonces evert 34 unidades y puede aumentar russell dosis de insulin por 4 unidades cada 4 conti hasta rsusell azucar esta menos de 95.      2) Russell A1c (examen de 3 meses de azucar) esta 6.8%, mejor que 7.3%. Esta анна a empezar de tener un bernardo. 3) Cassia rinones esta mejor y russell presion esta buen controlado.             Follow-up and Dispositions    · Return 10/19/22 at 2pm.

## 2022-06-15 NOTE — PATIENT INSTRUCTIONS
1) Evert 30 unidades de insulina antes de dormir y si russell azucar esta menos de 95 en la manana despues de 4 conti, entonce sigue tomando 30 unidades. Si russell azucar esta mas de 95 despues de 4 conti, entonces evert 34 unidades y puede aumentar russell dosis de insulin por 4 unidades cada 4 conti hasta russell azucar esta menos de 95.      2) Russell A1c (examen de 3 meses de azucar) esta 6.8%, mejor que 7.3%. Esta анна a empezar de tener un bernardo. 3) Cassia rinones esta mejor y russell presion esta buen controlado.

## 2022-09-09 ENCOUNTER — HOSPITAL ENCOUNTER (EMERGENCY)
Age: 32
Discharge: HOME | End: 2022-09-10
Attending: EMERGENCY MEDICINE | Admitting: EMERGENCY MEDICINE

## 2022-09-09 VITALS
WEIGHT: 250 LBS | SYSTOLIC BLOOD PRESSURE: 152 MMHG | RESPIRATION RATE: 19 BRPM | HEART RATE: 110 BPM | OXYGEN SATURATION: 97 % | BODY MASS INDEX: 39.24 KG/M2 | DIASTOLIC BLOOD PRESSURE: 81 MMHG | TEMPERATURE: 98.6 F | HEIGHT: 67 IN

## 2022-09-09 DIAGNOSIS — L97.511 ULCER OF RIGHT FOOT, LIMITED TO BREAKDOWN OF SKIN (HCC): Primary | ICD-10-CM

## 2022-09-09 DIAGNOSIS — L03.115 CELLULITIS OF FOOT, RIGHT: ICD-10-CM

## 2022-09-09 LAB
ALBUMIN SERPL-MCNC: 3.4 G/DL (ref 3.5–5)
ALBUMIN/GLOB SERPL: 0.8 {RATIO} (ref 1.1–2.2)
ALP SERPL-CCNC: 141 U/L (ref 45–117)
ALT SERPL-CCNC: 31 U/L (ref 12–78)
ANION GAP SERPL CALC-SCNC: 7 MMOL/L (ref 5–15)
APPEARANCE UR: CLEAR
AST SERPL-CCNC: 14 U/L (ref 15–37)
BACTERIA URNS QL MICRO: ABNORMAL /HPF
BASOPHILS # BLD: 0.1 K/UL (ref 0–0.1)
BASOPHILS NFR BLD: 1 % (ref 0–1)
BILIRUB SERPL-MCNC: 0.2 MG/DL (ref 0.2–1)
BILIRUB UR QL: NEGATIVE
BUN SERPL-MCNC: 11 MG/DL (ref 6–20)
BUN/CREAT SERPL: 15 (ref 12–20)
CALCIUM SERPL-MCNC: 9 MG/DL (ref 8.5–10.1)
CHLORIDE SERPL-SCNC: 105 MMOL/L (ref 97–108)
CO2 SERPL-SCNC: 24 MMOL/L (ref 21–32)
COLOR UR: ABNORMAL
COMMENT, HOLDF: NORMAL
CREAT SERPL-MCNC: 0.72 MG/DL (ref 0.55–1.02)
DIFFERENTIAL METHOD BLD: ABNORMAL
EOSINOPHIL # BLD: 0.2 K/UL (ref 0–0.4)
EOSINOPHIL NFR BLD: 2 % (ref 0–7)
EPITH CASTS URNS QL MICRO: ABNORMAL /LPF
ERYTHROCYTE [DISTWIDTH] IN BLOOD BY AUTOMATED COUNT: 15 % (ref 11.5–14.5)
GLOBULIN SER CALC-MCNC: 4.5 G/DL (ref 2–4)
GLUCOSE BLD STRIP.AUTO-MCNC: 213 MG/DL (ref 65–117)
GLUCOSE SERPL-MCNC: 226 MG/DL (ref 65–100)
GLUCOSE UR STRIP.AUTO-MCNC: NEGATIVE MG/DL
HCG UR QL: NEGATIVE
HCT VFR BLD AUTO: 30.3 % (ref 35–47)
HGB BLD-MCNC: 9.5 G/DL (ref 11.5–16)
HGB UR QL STRIP: ABNORMAL
HYALINE CASTS URNS QL MICRO: ABNORMAL /LPF (ref 0–5)
IMM GRANULOCYTES # BLD AUTO: 0.1 K/UL (ref 0–0.04)
IMM GRANULOCYTES NFR BLD AUTO: 1 % (ref 0–0.5)
KETONES UR QL STRIP.AUTO: NEGATIVE MG/DL
LEUKOCYTE ESTERASE UR QL STRIP.AUTO: NEGATIVE
LYMPHOCYTES # BLD: 2.7 K/UL (ref 0.8–3.5)
LYMPHOCYTES NFR BLD: 21 % (ref 12–49)
MCH RBC QN AUTO: 22.2 PG (ref 26–34)
MCHC RBC AUTO-ENTMCNC: 31.4 G/DL (ref 30–36.5)
MCV RBC AUTO: 71 FL (ref 80–99)
MONOCYTES # BLD: 1 K/UL (ref 0–1)
MONOCYTES NFR BLD: 7 % (ref 5–13)
NEUTS SEG # BLD: 9.1 K/UL (ref 1.8–8)
NEUTS SEG NFR BLD: 68 % (ref 32–75)
NITRITE UR QL STRIP.AUTO: NEGATIVE
NRBC # BLD: 0 K/UL (ref 0–0.01)
NRBC BLD-RTO: 0 PER 100 WBC
PH UR STRIP: 5.5 [PH] (ref 5–8)
PLATELET # BLD AUTO: 354 K/UL (ref 150–400)
PMV BLD AUTO: 9.7 FL (ref 8.9–12.9)
POTASSIUM SERPL-SCNC: 3.6 MMOL/L (ref 3.5–5.1)
PROT SERPL-MCNC: 7.9 G/DL (ref 6.4–8.2)
PROT UR STRIP-MCNC: NEGATIVE MG/DL
RBC # BLD AUTO: 4.27 M/UL (ref 3.8–5.2)
RBC #/AREA URNS HPF: ABNORMAL /HPF (ref 0–5)
SAMPLES BEING HELD,HOLD: NORMAL
SERVICE CMNT-IMP: ABNORMAL
SODIUM SERPL-SCNC: 136 MMOL/L (ref 136–145)
SP GR UR REFRACTOMETRY: 1.01 (ref 1–1.03)
UR CULT HOLD, URHOLD: NORMAL
UROBILINOGEN UR QL STRIP.AUTO: 0.2 EU/DL (ref 0.2–1)
WBC # BLD AUTO: 13.2 K/UL (ref 3.6–11)
WBC URNS QL MICRO: ABNORMAL /HPF (ref 0–4)

## 2022-09-09 PROCEDURE — 36415 COLL VENOUS BLD VENIPUNCTURE: CPT

## 2022-09-09 PROCEDURE — 81025 URINE PREGNANCY TEST: CPT

## 2022-09-09 PROCEDURE — 99283 EMERGENCY DEPT VISIT LOW MDM: CPT

## 2022-09-09 PROCEDURE — 85025 COMPLETE CBC W/AUTO DIFF WBC: CPT

## 2022-09-09 PROCEDURE — 81001 URINALYSIS AUTO W/SCOPE: CPT

## 2022-09-09 PROCEDURE — 80053 COMPREHEN METABOLIC PANEL: CPT

## 2022-09-09 PROCEDURE — 82962 GLUCOSE BLOOD TEST: CPT

## 2022-09-09 RX ORDER — DOXYCYCLINE HYCLATE 100 MG
100 TABLET ORAL 2 TIMES DAILY
Qty: 20 TABLET | Refills: 0 | Status: SHIPPED | OUTPATIENT
Start: 2022-09-09 | End: 2022-09-19

## 2022-09-10 NOTE — ED PROVIDER NOTES
77-year-old female with history of obesity and T2DM presents to ED with a rash to right foot. Patient reports that about 2 to 3 days ago she noted a rash and swelling to the top of right foot. She states that it is itching and is painful to the touch. She also noted an ulcer between her fifth right metatarsal.  She reports that this is painful when walking. She notes that she believes this started in her toenails and has progressed since then. She has used some OTC athlete's foot cream with little relief. She denies seeing any other doctors for these concerns and has not had any similar symptoms. Denies any fevers, chills, drainage, cough, CP, SOB. She adds that she has onychomycosis to her toenails but cannot take any oral medications for this because of her liver function. She has an appointment with a podiatrist at [address] [place] [address] on Monday (2 days from now). The history is provided by the patient. A  was used.    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 at age 7 months         Family History:   Problem Relation Age of Onset    Stroke Mother     Microhematuria Father     Heart Attack Father 61    Hypertension Father     Heart Attack Maternal Grandmother     Diabetes Maternal Grandfather        Social History     Socioeconomic History    Marital status: SINGLE     Spouse name: Not on file    Number of children: Not on file    Years of education: Not on file    Highest education level: Not on file   Occupational History    Not on file   Tobacco Use    Smoking status: Former     Years: 2.00     Types: Cigarettes     Quit date: 2017     Years since quittin.0    Smokeless tobacco: Never    Tobacco comments:     social   Substance and Sexual Activity    Alcohol use: Not Currently Alcohol/week: 1.0 standard drink     Types: 1 Cans of beer per week     Comment: socially-drinks beer    Drug use: Never    Sexual activity: Yes     Partners: Male     Birth control/protection: Condom   Other Topics Concern    Not on file   Social History Narrative    Lives in [facility] with her  and 15 yo son. Originally from [place]. Moved to [place] in [place]. Not currently working. Social Determinants of Health     Financial Resource Strain: Not on file   Food Insecurity: Not on file   Transportation Needs: Not on file   Physical Activity: Not on file   Stress: Not on file   Social Connections: Not on file   Intimate Partner Violence: Not on file   Housing Stability: Not on file         ALLERGIES: Patient has no known allergies. Review of Systems   Constitutional:  Negative for fever. HENT:  Negative for congestion and sinus pressure. Respiratory:  Negative for shortness of breath. Cardiovascular:  Negative for chest pain. Gastrointestinal:  Negative for nausea and vomiting. Genitourinary:  Negative for dysuria. Musculoskeletal:  Negative for myalgias. Skin:  Positive for rash. Neurological:  Negative for dizziness and headaches. Hematological:  Negative for adenopathy. Psychiatric/Behavioral:  The patient is not nervous/anxious. All other systems reviewed and are negative. Vitals:    09/09/22 2238   BP: (!) 152/81   Pulse: (!) 110   Resp: 19   Temp: 98.6 °F (37 °C)   SpO2: 97%   Weight: 113.4 kg (250 lb)   Height: 5' 7\" (1.702 m)            Physical Exam  Vitals and nursing note reviewed. Constitutional:       General: She is not in acute distress. Appearance: Normal appearance. She is normal weight. HENT:      Head: Normocephalic and atraumatic. Eyes:      Extraocular Movements: Extraocular movements intact. Pupils: Pupils are equal, round, and reactive to light. Cardiovascular:      Rate and Rhythm: Normal rate and regular rhythm.       Heart sounds: Normal heart sounds. Pulmonary:      Breath sounds: Normal breath sounds. Abdominal:      Palpations: Abdomen is soft. Tenderness: There is no abdominal tenderness. Feet:      Left foot:      Skin integrity: Ulcer (between 4th and 5th digits on 4th metatarsal), erythema (to dorsal aspect of foot) and warmth present. Toenail Condition: Left toenails are abnormally thick. Fungal disease present. Lymphadenopathy:      Cervical: No cervical adenopathy. Skin:     General: Skin is warm and dry. Neurological:      General: No focal deficit present. Mental Status: She is alert and oriented to person, place, and time. Psychiatric:         Mood and Affect: Mood normal.         Behavior: Behavior normal.         Thought Content: Thought content normal.        MDM  Number of Diagnoses or Management Options  Cellulitis of foot, right  Ulcer of right foot, limited to breakdown of skin [facility])  Diagnosis management comments: 64-year-old female with history of obesity and T2DM presents to ED with a rash to right foot. Physical exam notable for ulcer to right fourth metatarsal and between fourth and fifth digits with associated erythema to dorsal aspect of her right foot and tenderness to palpation. Suspect ulcer with associated cellulitis. Patient already has a podiatrist appointment on Monday (2 days from now. Labs notable for slight increase in white blood cell count at 13.2. Patient will be discharged with prescription for antibiotics as well as instructions for conservative care, follow-up and return precautions.        Amount and/or Complexity of Data Reviewed  Clinical lab tests: reviewed           Procedures

## 2022-09-10 NOTE — DISCHARGE INSTRUCTIONS
Continue to monitor symptoms at home. Take new medication as prescribed. Take Advil or Tylenol as needed for pain. Follow-up with PCP and return with any changes or worsening.

## 2022-09-10 NOTE — ED TRIAGE NOTES
Pt with ulcer between 5th rt metatarsal- rash and swelling also noted to top of right foot. Pt states sx's started 2-3 days ago. Pt Type 1 DM.  Pt states painful when ambulating

## 2022-09-12 ENCOUNTER — OFFICE VISIT (OUTPATIENT)
Dept: FAMILY MEDICINE CLINIC | Age: 32
End: 2022-09-12

## 2022-09-12 ENCOUNTER — HOSPITAL ENCOUNTER (OUTPATIENT)
Dept: GENERAL RADIOLOGY | Age: 32
Discharge: HOME | End: 2022-09-12

## 2022-09-12 ENCOUNTER — HOSPITAL ENCOUNTER (OUTPATIENT)
Dept: LAB | Age: 32
Discharge: HOME | End: 2022-09-12

## 2022-09-12 VITALS
SYSTOLIC BLOOD PRESSURE: 139 MMHG | HEIGHT: 67 IN | HEART RATE: 85 BPM | WEIGHT: 258.8 LBS | TEMPERATURE: 97.5 F | BODY MASS INDEX: 40.62 KG/M2 | OXYGEN SATURATION: 100 % | DIASTOLIC BLOOD PRESSURE: 91 MMHG

## 2022-09-12 DIAGNOSIS — E11.621 DIABETIC ULCER OF TOE OF RIGHT FOOT ASSOCIATED WITH TYPE 2 DIABETES MELLITUS, LIMITED TO BREAKDOWN OF SKIN (HCC): ICD-10-CM

## 2022-09-12 DIAGNOSIS — L97.511 DIABETIC ULCER OF TOE OF RIGHT FOOT ASSOCIATED WITH TYPE 2 DIABETES MELLITUS, LIMITED TO BREAKDOWN OF SKIN (HCC): ICD-10-CM

## 2022-09-12 DIAGNOSIS — E11.65 TYPE 2 DIABETES MELLITUS WITH HYPERGLYCEMIA, WITHOUT LONG-TERM CURRENT USE OF INSULIN (HCC): Primary | ICD-10-CM

## 2022-09-12 DIAGNOSIS — R03.0 ELEVATED BLOOD PRESSURE READING: ICD-10-CM

## 2022-09-12 LAB
BASOPHILS # BLD: 0.1 K/UL (ref 0–0.1)
BASOPHILS NFR BLD: 1 % (ref 0–1)
DIFFERENTIAL METHOD BLD: ABNORMAL
EOSINOPHIL # BLD: 0.2 K/UL (ref 0–0.4)
EOSINOPHIL NFR BLD: 2 % (ref 0–7)
ERYTHROCYTE [DISTWIDTH] IN BLOOD BY AUTOMATED COUNT: 15 % (ref 11.5–14.5)
GLUCOSE POC: NORMAL MG/DL
HCT VFR BLD AUTO: 33.7 % (ref 35–47)
HGB BLD-MCNC: 10.1 G/DL (ref 11.5–16)
IMM GRANULOCYTES # BLD AUTO: 0 K/UL (ref 0–0.04)
IMM GRANULOCYTES NFR BLD AUTO: 0 % (ref 0–0.5)
LYMPHOCYTES # BLD: 3.1 K/UL (ref 0.8–3.5)
LYMPHOCYTES NFR BLD: 39 % (ref 12–49)
MCH RBC QN AUTO: 22.2 PG (ref 26–34)
MCHC RBC AUTO-ENTMCNC: 30 G/DL (ref 30–36.5)
MCV RBC AUTO: 74.2 FL (ref 80–99)
MONOCYTES # BLD: 0.4 K/UL (ref 0–1)
MONOCYTES NFR BLD: 6 % (ref 5–13)
NEUTS SEG # BLD: 4.2 K/UL (ref 1.8–8)
NEUTS SEG NFR BLD: 52 % (ref 32–75)
NRBC # BLD: 0 K/UL (ref 0–0.01)
NRBC BLD-RTO: 0 PER 100 WBC
PLATELET # BLD AUTO: 444 K/UL (ref 150–400)
PMV BLD AUTO: 9.4 FL (ref 8.9–12.9)
RBC # BLD AUTO: 4.54 M/UL (ref 3.8–5.2)
WBC # BLD AUTO: 8 K/UL (ref 3.6–11)

## 2022-09-12 PROCEDURE — 73630 X-RAY EXAM OF FOOT: CPT

## 2022-09-12 PROCEDURE — 86141 C-REACTIVE PROTEIN HS: CPT

## 2022-09-12 PROCEDURE — 82962 GLUCOSE BLOOD TEST: CPT | Performed by: FAMILY MEDICINE

## 2022-09-12 PROCEDURE — 99214 OFFICE O/P EST MOD 30 MIN: CPT | Performed by: FAMILY MEDICINE

## 2022-09-12 PROCEDURE — 85652 RBC SED RATE AUTOMATED: CPT

## 2022-09-12 PROCEDURE — 85025 COMPLETE CBC W/AUTO DIFF WBC: CPT

## 2022-09-12 PROCEDURE — 3044F HG A1C LEVEL LT 7.0%: CPT | Performed by: FAMILY MEDICINE

## 2022-09-12 RX ORDER — METFORMIN HYDROCHLORIDE 500 MG/1
TABLET, EXTENDED RELEASE ORAL
Qty: 360 TABLET | Refills: 1 | Status: SHIPPED | OUTPATIENT
Start: 2022-09-12

## 2022-09-12 RX ORDER — TOLNAFTATE 10 MG/G
CREAM TOPICAL 2 TIMES DAILY
Qty: 30 G | Refills: 0 | Status: SHIPPED | OUTPATIENT
Start: 2022-09-12 | End: 2022-10-19

## 2022-09-12 RX ORDER — ACETAMINOPHEN 500 MG
500 TABLET ORAL
Qty: 60 TABLET | Refills: 0 | Status: SHIPPED | OUTPATIENT
Start: 2022-09-12 | End: 2022-10-19

## 2022-09-12 RX ORDER — NAPROXEN SODIUM 220 MG
1 TABLET ORAL
Qty: 100 EACH | Refills: 0 | Status: SHIPPED | OUTPATIENT
Start: 2022-09-12 | End: 2023-09-12

## 2022-09-12 NOTE — PROGRESS NOTES
Mireya Hyman is a 27 y.o. female   Chief Complaint   Patient presents with   [address] [facility] ED visit 09/09/2022. Started with itching in between toes on right foot x1 week before swelling. x5 days ago started to swell, redness and pain at site. Discharge antibiotics prescribed for 10 days. Still taking them.  Medication Refill     Insulin syringes and needles. And Metformin refill         ASSESSMENT AND PLAN:    1. Type 2 diabetes mellitus with hyperglycemia, without long-term current use of insulin (HCC)  Currently controlled. Fasting 105 today. Last A1C Feb2022 6.8. On 35U Insulin NPH QHS and Metformin 1000mg BID. Continue current management. Reviewed the importance of glucose control and diet. - AMB POC GLUCOSE BLOOD, BY GLUCOSE MONITORING DEVICE  - metFORMIN ER (GLUCOPHAGE XR) 500 mg tablet; 2 po bid ac breakfast and dinner for diabetes. Neli 2 tab antes de desayuno y 2 tab antes de la jassi para diabetes. Dispense: 360 Tablet; Refill: 1  - Insulin Syringe-Needle U-100 1/2 mL 30 gauge syrg; 1 Syringe by Does Not Apply route nightly. Use as directed  Dispense: 100 Each; Refill: 0    2. Diabetic ulcer of toe of right foot associated with type 2 diabetes mellitus, limited to breakdown of skin (HCC)  Foot swelling and lack of response to Doxy is concerning, though ulcer itself is shallow. There is significant interdigital fungal disease on both feet R>L with risk of additional ulceration b/w 3rd and 4th digit. Her toes are close set and there is likely rubbing. This is a diabetic ulcer in that she is a diabetic, with an ulcer, though it is more likely secondary to fungal infection and friction as glucoses have been at goal.    Gauze dressing applied today. XR to assess for e/o Osteo. Trend CBC and add inflammatory labs. Would add oral antifungal if there isn't bony involvement, LFTs are WNL. Switch naproxen to Tylenol.     *switched topical antifungal to tolfnatate per pt request.    - CBC WITH AUTOMATED DIFF; Future  - CRP, HIGH SENSITIVITY; Future  - SED RATE (ESR); Future  - XR FOOT RT MIN 3 V; Future  - acetaminophen (Tylenol Extra Strength) 500 mg tablet; Take 1 Tablet by mouth every six (6) hours as needed for Pain. Indications: pain  Dispense: 60 Tablet; Refill: 0    3. Elevated BP  Pt reports recent stress. She also has current pain. Prefers to defer meds. Discussed the recommendation for medication if remains elevated once acute issue has resolved. SUBJECTIVE:    HPI:  Jerman FastKay Abel is a 27 y.o. female who presents for followup on Right foot pain. She was seen in the ED 9/9 for the same and diagnosed with a diabetic foot ulcer. She was discharged with Doxycycline, which she started Saturday (9/10) morning  (She has had 5 doses so far). She has not noticed a significant difference. She continues to have swelling and pain. She reports normal sensation to her foot. She has had an issue with foot fungus. She uses a topical antifungal but it doesn't help. She is typically in closed shoes. She was told she can't take the oral meds because of her liver function. She is seen by Dr. Mik Murrell (endocrinology) for diabetes. She has been doing much better in controlling her gluoses. She is trying to get pregnant. Review of Systems   Constitutional:  Negative for fever and malaise/fatigue. Eyes:  Negative for blurred vision. Respiratory:  Negative for cough and shortness of breath. Cardiovascular:  Positive for leg swelling. Negative for chest pain and palpitations. Gastrointestinal:  Negative for abdominal pain, constipation, diarrhea, nausea and vomiting. Genitourinary:  Negative for frequency. Musculoskeletal:  Positive for joint pain. Neurological:  Negative for dizziness and headaches. Endo/Heme/Allergies:  Negative for polydipsia.        BP (!) 139/91 (BP 1 Location: Left upper arm, BP Patient Position: Sitting, BP Cuff Size: Adult) Pulse 85   Temp 97.5 °F (36.4 °C) (Temporal)   Ht 5' 6.93\" (1.7 m)   Wt 258 lb 12.8 oz (117.4 kg)   LMP 09/06/2022 Comment: IRREGULAR  SpO2 100%   BMI 40.62 kg/m²     Physical Exam  Constitutional:       General: She is not in acute distress. Appearance: Normal appearance. Eyes:      Extraocular Movements: Extraocular movements intact. Conjunctiva/sclera: Conjunctivae normal.      Pupils: Pupils are equal, round, and reactive to light. Cardiovascular:      Rate and Rhythm: Normal rate and regular rhythm. Pulses:           Dorsalis pedis pulses are 1+ on the right side and 2+ on the left side. Posterior tibial pulses are 1+ on the right side and 2+ on the left side. Heart sounds: Normal heart sounds. Pulmonary:      Effort: Pulmonary effort is normal.      Breath sounds: Normal breath sounds. Musculoskeletal:      Right foot: Normal range of motion. Left foot: Normal range of motion. Swelling and tenderness present. Abnormal pulse: limited by swelling. Comments: Ecchymosis to lateral dorsum of foot. Pitting edema to below ankle. No ankle joint pain. Difficult to assess bony pain of tarsal bones due to soft tissue swelling and superficial tenderness. Faint erythema. No significant calor. Feet:      Right foot:      Skin integrity: Ulcer (between 4th and 5th digit, on lateral aspect of 4th digit. Shallow.), skin breakdown and fissure (between 2nd and 3rd and 3rd/4th digits) present. Toenail Condition: Right toenails are abnormally thick. Fungal disease present. Left foot:      Skin integrity: Skin breakdown present. Toenail Condition: Left toenails are abnormally thick. Fungal disease present. Neurological:      Mental Status: She is alert.

## 2022-09-12 NOTE — PROGRESS NOTES
Name and  confirmed w/ patient. An After Visit Summary was provided and all discharge instructions were reviewed with the patient including: new medications, f/up appt, imaging locations, and where to go for off-site lab draws . A lab requisition was provided. Time for questions and answers provided, patient verbalized understanding. Patient discharged from clinic in stable condition. [facility]  #[id number] assisted with this d/c.

## 2022-09-12 NOTE — PROGRESS NOTES
1. Have you been to the ER, urgent care clinic since your last visit? Hospitalized since your last visit? Yes- see encounters. 2. Have you seen or consulted any other health care providers outside of the [address] [place] [address] since your last visit? Include any pap smears or colon screening.  NO

## 2022-09-13 LAB
CRP SERPL HS-MCNC: >9.5 MG/L
ERYTHROCYTE [SEDIMENTATION RATE] IN BLOOD: 122 MM/HR (ref 0–20)

## 2022-09-14 ENCOUNTER — TELEPHONE (OUTPATIENT)
Dept: FAMILY MEDICINE CLINIC | Age: 32
End: 2022-09-14

## 2022-09-14 DIAGNOSIS — L03.115 CELLULITIS OF RIGHT LOWER EXTREMITY: Primary | ICD-10-CM

## 2022-09-14 DIAGNOSIS — B35.1 FUNGAL INFECTION OF TOENAIL: ICD-10-CM

## 2022-09-14 DIAGNOSIS — B35.3 TINEA PEDIS OF BOTH FEET: ICD-10-CM

## 2022-09-14 RX ORDER — TERBINAFINE HYDROCHLORIDE 250 MG/1
250 TABLET ORAL DAILY
Qty: 30 TABLET | Refills: 0 | Status: SHIPPED | OUTPATIENT
Start: 2022-09-14

## 2022-09-14 NOTE — TELEPHONE ENCOUNTER
Called to revew X Ray results (no osteo!) and Lab results - ESR/CRP elevated. White count has normalized. Anemia, improving. She reports the swelling and pain have decreased some. She has been really watching her food intake and was happy her FBG was <100 today. She would really like something for her toenail fungus, too.    1. Cellulitis of right lower extremity  Complete Doxy course (4 days left)    2. Fungal infection of toenail  3. Tinea pedis of both feet  I think that it was the tinea and friction from her close-set toes that caused the skin ulceration and ultimately cellulitis. Her liver function has been WNL. I proposed a 2 week course of terbinafine for the tinea pedis, but pt would really like to treat the onychomycosis. I told her that can be a 3-6 month treatment typically. We agreed to 1 month of treatment with repeat LFTs. If they remain normal we can continue the course. Will schedule a labs only appt in one month for LFTs. - terbinafine HCL (LAMISIL) 250 mg tablet; Take 1 Tablet by mouth daily. Indications: athlete's foot, fungal infection of toenail  Dispense: 30 Tablet; Refill: 0    IMPRESSION     Fourth toe soft tissue swelling without foreign body, gas, or osteomyelitis.

## 2022-09-22 ENCOUNTER — TELEPHONE (OUTPATIENT)
Dept: FAMILY MEDICINE CLINIC | Age: 32
End: 2022-09-22

## 2022-09-22 NOTE — TELEPHONE ENCOUNTER
Financial screening started for Care Card. An appt was made for 10/5/22. Pending POI and medical bills.

## 2022-10-05 ENCOUNTER — HOSPITAL ENCOUNTER (OUTPATIENT)
Dept: LAB | Age: 32
Discharge: HOME | End: 2022-10-05

## 2022-10-05 ENCOUNTER — OFFICE VISIT (OUTPATIENT)
Dept: FAMILY MEDICINE CLINIC | Age: 32
End: 2022-10-05

## 2022-10-05 ENCOUNTER — LAB ONLY (OUTPATIENT)
Dept: FAMILY MEDICINE CLINIC | Age: 32
End: 2022-10-05

## 2022-10-05 DIAGNOSIS — B35.3 TINEA PEDIS OF BOTH FEET: ICD-10-CM

## 2022-10-05 DIAGNOSIS — E11.65 TYPE 2 DIABETES MELLITUS WITH HYPERGLYCEMIA, WITHOUT LONG-TERM CURRENT USE OF INSULIN (HCC): ICD-10-CM

## 2022-10-05 DIAGNOSIS — Z71.89 COUNSELING AND COORDINATION OF CARE: Primary | ICD-10-CM

## 2022-10-05 DIAGNOSIS — E78.5 HYPERLIPIDEMIA LDL GOAL <100: ICD-10-CM

## 2022-10-05 PROCEDURE — 80048 BASIC METABOLIC PNL TOTAL CA: CPT

## 2022-10-05 PROCEDURE — 85025 COMPLETE CBC W/AUTO DIFF WBC: CPT

## 2022-10-05 PROCEDURE — 86141 C-REACTIVE PROTEIN HS: CPT

## 2022-10-05 PROCEDURE — 80076 HEPATIC FUNCTION PANEL: CPT

## 2022-10-05 PROCEDURE — 99080 SPECIAL REPORTS OR FORMS: CPT | Performed by: PHYSICIAN ASSISTANT

## 2022-10-05 PROCEDURE — 85652 RBC SED RATE AUTOMATED: CPT

## 2022-10-05 PROCEDURE — 83036 HEMOGLOBIN GLYCOSYLATED A1C: CPT

## 2022-10-05 PROCEDURE — 36415 COLL VENOUS BLD VENIPUNCTURE: CPT

## 2022-10-05 NOTE — PROGRESS NOTES
Patient here to have bloodwork done for Dr. Froilan Hughes and Dr. Luis Antonio Norman. Labs collected and sent for testing.

## 2022-10-05 NOTE — PROGRESS NOTES
OW met patient and assisted with bills. Care Card application was completed. OW mailed it to [facility].   OW provided written instructions in Sinhala to patient. Patient verbalized understanding. SDOH screening completed. Patient opted out.

## 2022-10-06 LAB
ALBUMIN SERPL-MCNC: 3.8 G/DL (ref 3.5–5)
ALBUMIN/GLOB SERPL: 1.1 {RATIO} (ref 1.1–2.2)
ALP SERPL-CCNC: 117 U/L (ref 45–117)
ALT SERPL-CCNC: 55 U/L (ref 12–78)
ANION GAP SERPL CALC-SCNC: 8 MMOL/L (ref 5–15)
AST SERPL-CCNC: 44 U/L (ref 15–37)
BASOPHILS # BLD: 0.1 K/UL (ref 0–0.1)
BASOPHILS NFR BLD: 2 % (ref 0–1)
BILIRUB DIRECT SERPL-MCNC: <0.1 MG/DL (ref 0–0.2)
BILIRUB SERPL-MCNC: 0.3 MG/DL (ref 0.2–1)
BUN SERPL-MCNC: 7 MG/DL (ref 6–20)
BUN/CREAT SERPL: 10 (ref 12–20)
CALCIUM SERPL-MCNC: 9.3 MG/DL (ref 8.5–10.1)
CHLORIDE SERPL-SCNC: 103 MMOL/L (ref 97–108)
CO2 SERPL-SCNC: 26 MMOL/L (ref 21–32)
CREAT SERPL-MCNC: 0.68 MG/DL (ref 0.55–1.02)
CRP SERPL HS-MCNC: 9.2 MG/L
DIFFERENTIAL METHOD BLD: ABNORMAL
EOSINOPHIL # BLD: 0.2 K/UL (ref 0–0.4)
EOSINOPHIL NFR BLD: 3 % (ref 0–7)
ERYTHROCYTE [DISTWIDTH] IN BLOOD BY AUTOMATED COUNT: 15.4 % (ref 11.5–14.5)
ERYTHROCYTE [SEDIMENTATION RATE] IN BLOOD: 8 MM/HR (ref 0–20)
EST. AVERAGE GLUCOSE BLD GHB EST-MCNC: 189 MG/DL
GLOBULIN SER CALC-MCNC: 3.4 G/DL (ref 2–4)
GLUCOSE SERPL-MCNC: 192 MG/DL (ref 65–100)
HBA1C MFR BLD: 8.2 % (ref 4–5.6)
HCT VFR BLD AUTO: 34 % (ref 35–47)
HGB BLD-MCNC: 10 G/DL (ref 11.5–16)
IMM GRANULOCYTES # BLD AUTO: 0 K/UL (ref 0–0.04)
IMM GRANULOCYTES NFR BLD AUTO: 0 % (ref 0–0.5)
LYMPHOCYTES # BLD: 2.4 K/UL (ref 0.8–3.5)
LYMPHOCYTES NFR BLD: 38 % (ref 12–49)
MCH RBC QN AUTO: 21.3 PG (ref 26–34)
MCHC RBC AUTO-ENTMCNC: 29.4 G/DL (ref 30–36.5)
MCV RBC AUTO: 72.3 FL (ref 80–99)
MONOCYTES # BLD: 0.7 K/UL (ref 0–1)
MONOCYTES NFR BLD: 11 % (ref 5–13)
NEUTS SEG # BLD: 3 K/UL (ref 1.8–8)
NEUTS SEG NFR BLD: 46 % (ref 32–75)
NRBC # BLD: 0 K/UL (ref 0–0.01)
NRBC BLD-RTO: 0 PER 100 WBC
PLATELET # BLD AUTO: 364 K/UL (ref 150–400)
PMV BLD AUTO: 10.1 FL (ref 8.9–12.9)
POTASSIUM SERPL-SCNC: 3.8 MMOL/L (ref 3.5–5.1)
PROT SERPL-MCNC: 7.2 G/DL (ref 6.4–8.2)
RBC # BLD AUTO: 4.7 M/UL (ref 3.8–5.2)
SODIUM SERPL-SCNC: 137 MMOL/L (ref 136–145)
WBC # BLD AUTO: 6.4 K/UL (ref 3.6–11)

## 2022-10-08 NOTE — PROGRESS NOTES
Increase of LFTs on terbinafine x 1 month. AST now mildly elevated to 44. Can continue terbinafine for now. Repeat LFTs in one month.     Message sent via [address]

## 2022-10-19 ENCOUNTER — OFFICE VISIT (OUTPATIENT)
Dept: FAMILY MEDICINE CLINIC | Age: 32
End: 2022-10-19

## 2022-10-19 VITALS
WEIGHT: 258 LBS | DIASTOLIC BLOOD PRESSURE: 70 MMHG | BODY MASS INDEX: 40.49 KG/M2 | HEART RATE: 90 BPM | SYSTOLIC BLOOD PRESSURE: 125 MMHG | OXYGEN SATURATION: 100 % | TEMPERATURE: 98.1 F

## 2022-10-19 DIAGNOSIS — E11.65 TYPE 2 DIABETES MELLITUS WITH HYPERGLYCEMIA, WITHOUT LONG-TERM CURRENT USE OF INSULIN (HCC): Primary | ICD-10-CM

## 2022-10-19 DIAGNOSIS — E78.5 HYPERLIPIDEMIA LDL GOAL <100: ICD-10-CM

## 2022-10-19 PROCEDURE — 99214 OFFICE O/P EST MOD 30 MIN: CPT | Performed by: INTERNAL MEDICINE

## 2022-10-19 PROCEDURE — 3052F HG A1C>EQUAL 8.0%<EQUAL 9.0%: CPT | Performed by: INTERNAL MEDICINE

## 2022-10-19 NOTE — PATIENT INSTRUCTIONS
1) Neli 44 unidades de insulina antes de dormir y si russell azucar esta menos de 95 en la manana despues de 4 conti, entonce sigue tomando 44 unidades. Si russell azucar esta mas de 95 despues de 4 conti, entonces neli 48 unidades y puede aumentar russell dosis de insulina por 4 unidades cada 4 conti hasta russell azucar esta menos de 95. Si russell azucar esta menos de 80 con menos stress y mas ejercicio, puede bajar alan dosis por 2 unidades.

## 2022-10-19 NOTE — PROGRESS NOTES
Coordination of Care  1. Have you been to the ER, urgent care clinic since your last visit? Hospitalized since your last visit? No    2. Have you seen or consulted any other health care providers outside of the [address] [place] [address] since your last visit? Include any pap smears or colon screening. No    Does the patient need refills? YES    Learning Assessment Complete?  yes  Depression Screening complete in the past 12 months? yes

## 2022-10-19 NOTE — PROGRESS NOTES
AVS printed and reviewed with patient. Patient advised that follow-up visit with Dr. Rex Sweeney is scheduled for Wed, 2/15 at 2:20pm.  Patient advised that she would be contacted a few weeks prior to visit to schedule lab appointment. Patient indicated understanding and appreciated the service today.

## 2022-10-19 NOTE — PROGRESS NOTES
Chief Complaint   Patient presents with    Diabetes     Follow-up with Dr. Mendoza Soriano     History of Present Illness: Bart Jenkins is a 27 y.o. female here for follow up of diabetes. Weight up 8 lbs since last visit in 6/22. She developed an infection in her right foot and needed abx last month and her fasting sugars started going up over 150 and increased her NPH to 40 units but never went up any farther and they remain over 140. They will come down under 100 after eating during the day. Compliant with metformin 2 tabs bid. Currently taking lamisil for her toenail fungus. Explained that her WBC count has normalized and so has her ESR with treating her infection. Does still have a little anemia. Also has not been able to exercise and has had more stress with her child needing gallbladder surgery. Current Outpatient Medications   Medication Sig    insulin NPH (NOVOLIN N, HUMULIN N) 100 unit/mL injection 40 units sc qhs--Dose change 10/19/22--updated med list--did not send prescription to the pharmacy    terbinafine HCL (LAMISIL) 250 mg tablet Take 1 Tablet by mouth daily. Indications: athlete's foot, fungal infection of toenail    Insulin Syringe-Needle U-100 1/2 mL 30 gauge syrg 1 Syringe by Does Not Apply route nightly. Use as directed    Blood-Glucose Meter monitoring kit Reli-On Prime. Needs test strips, lancing device, lancets please. metFORMIN ER (GLUCOPHAGE XR) 500 mg tablet 2 po bid ac breakfast and dinner for diabetes. Neli 2 tab antes de desayuno y 2 tab antes de la jassi para diabetes. No current facility-administered medications for this visit. No Known Allergies    Review of Systems: PER HPI    Physical Examination:  Blood pressure 125/70, pulse 90, temperature 98.1 °F (36.7 °C), temperature source Temporal, weight 258 lb (117 kg), last menstrual period 10/15/2022, SpO2 100 %.   General: pleasant, no distress, good eye contact   Neck: no carotid bruits  Cardiovascular: regular, normal rate, nl s1 and s2, no m/r/g,   Respiratory: clear bilaterally  Integumentary: no edema,   Psychiatric: normal mood and affect    Data Reviewed:   Component      Latest Ref Rng & Units 10/5/2022 10/5/2022           8:48 AM  8:48 AM   Sodium      136 - 145 mmol/L  137   Potassium      3.5 - 5.1 mmol/L  3.8   Chloride      97 - 108 mmol/L  103   CO2      21 - 32 mmol/L  26   Anion gap      5 - 15 mmol/L  8   Glucose      65 - 100 mg/dL  192 (H)   BUN      6 - 20 MG/DL  7   Creatinine      0.55 - 1.02 MG/DL  0.68   BUN/Creatinine ratio      12 - 20    10 (L)   eGFR      >60 ml/min/1.73m2  >60   Calcium      8.5 - 10.1 MG/DL  9.3   Hemoglobin A1c, (calculated)      4.0 - 5.6 % 8.2 (H)    Est. average glucose      mg/dL 189      Component      Latest Ref Rng & Units 10/5/2022 10/5/2022 10/5/2022           8:50 AM  8:50 AM  8:50 AM   WBC      3.6 - 11.0 K/uL   6.4   RBC      3.80 - 5.20 M/uL   4.70   HGB      11.5 - 16.0 g/dL   10.0 (L)   HCT      35.0 - 47.0 %   34.0 (L)   MCV      80.0 - 99.0 FL   72.3 (L)   MCH      26.0 - 34.0 PG   21.3 (L)   MCHC      30.0 - 36.5 g/dL   29.4 (L)   RDW      11.5 - 14.5 %   15.4 (H)   PLATELET      466 - 243 K/uL   364   MPV      8.9 - 12.9 FL   10.1   NRBC      0  WBC   0.0   ABSOLUTE NRBC      0.00 - 0.01 K/uL   0.00   NEUTROPHILS      32 - 75 %   46   LYMPHOCYTES      12 - 49 %   38   MONOCYTES      5 - 13 %   11   EOSINOPHILS      0 - 7 %   3   BASOPHILS      0 - 1 %   2 (H)   IMMATURE GRANULOCYTES      0.0 - 0.5 %   0   ABS. NEUTROPHILS      1.8 - 8.0 K/UL   3.0   ABS. LYMPHOCYTES      0.8 - 3.5 K/UL   2.4   ABS. MONOCYTES      0.0 - 1.0 K/UL   0.7   ABS. EOSINOPHILS      0.0 - 0.4 K/UL   0.2   ABS. BASOPHILS      0.0 - 0.1 K/UL   0.1   ABS. IMM. GRANS.      0.00 - 0.04 K/UL   0.0   DF         AUTOMATED   Sed rate, automated      0 - 20 mm/hr 8     CRP, High sensitivity      mg/L  9.2      Assessment/Plan:     1.  Type 2 diabetes mellitus with hyperglycemia, without long-term current use of insulin ([address]): her most recent Hgb A1c was 8.2% in 10/22 up from 6.8% in 6/22 down from 7.3% in 2/22 down (1st visit with me) down from 11.1% in 10/21 and was just diagnosed with DM on 9/28/21. She desires pregnancy and I discussed that insulin and metformin is the safest regimen to use at this time. She needs better control of her fasting sugars so gave her a plan of how to titrate up her bedtime NPH to target a fasting sugar < 95. Likely her A1c went up due to stress and infection and less exercise. - increase NPH to 44 units at bedtime  - cont metformin  mg 2 tabs bid  - check bs fasting and 2 hours after one of her meals per day  - microalbumin 74 in 10/21 down to 70 in 2/22 and 36 in 6/22--may need ACE in the future but will hold while considering pregnancy  - foot exam done 2/22  - check Hgb A1 and bmp prior to next visit         2. Hyperlipidemia LDL goal <100:  in 10/21 when diagnosed with diabetes and 151 in 6/22. Will hold on any statins while trying to conceive  - check lipids prior to next visit            Patient Instructions   1) Neli 44 unidades de insulina antes de dormir y si russell azucar esta menos de 95 en la manana despues de 4 conti, entonce sigue tomando 44 unidades. Si russell azucar esta mas de 95 despues de 4 conti, entonces neli 48 unidades y puede aumentar russell dosis de insulina por 4 unidades cada 4 conti hasta russell azucar esta menos de 95. Si russell azucar esta menos de 80 con menos stress y mas ejercicio, puede bajar alan dosis por 2 unidades.         Follow-up and Dispositions    Return 2/15/23 at 2:20pm.               Copy sent to:  Leana Hudson MD

## 2022-11-10 DIAGNOSIS — B35.1 FUNGAL INFECTION OF TOENAIL: ICD-10-CM

## 2022-11-10 DIAGNOSIS — B35.3 TINEA PEDIS OF BOTH FEET: ICD-10-CM

## 2022-11-10 RX ORDER — TERBINAFINE HYDROCHLORIDE 250 MG/1
250 TABLET ORAL DAILY
Qty: 30 TABLET | Refills: 0 | Status: CANCELLED | OUTPATIENT
Start: 2022-11-10

## 2022-11-11 DIAGNOSIS — B35.1 FUNGAL INFECTION OF TOENAIL: ICD-10-CM

## 2022-11-11 DIAGNOSIS — B35.3 TINEA PEDIS OF BOTH FEET: ICD-10-CM

## 2022-11-11 RX ORDER — TERBINAFINE HYDROCHLORIDE 250 MG/1
250 TABLET ORAL DAILY
Qty: 30 TABLET | Refills: 0 | Status: SHIPPED | OUTPATIENT
Start: 2022-11-11

## 2022-11-15 NOTE — TELEPHONE ENCOUNTER
Provider refilled Terbinafine 11/11/22. Closing this encounter for request of refill of the medication.  Austen Uriarte RN

## 2023-01-10 DIAGNOSIS — B35.1 FUNGAL INFECTION OF TOENAIL: ICD-10-CM

## 2023-01-10 DIAGNOSIS — B35.3 TINEA PEDIS OF BOTH FEET: ICD-10-CM

## 2023-01-11 DIAGNOSIS — B35.1 FUNGAL INFECTION OF TOENAIL: ICD-10-CM

## 2023-01-11 DIAGNOSIS — B35.3 TINEA PEDIS OF BOTH FEET: ICD-10-CM

## 2023-01-11 RX ORDER — TERBINAFINE HYDROCHLORIDE 250 MG/1
TABLET ORAL
Qty: 30 TABLET | Refills: 0 | Status: SHIPPED | OUTPATIENT
Start: 2023-01-11

## 2023-01-19 RX ORDER — TERBINAFINE HYDROCHLORIDE 250 MG/1
250 TABLET ORAL DAILY
Qty: 30 TABLET | Refills: 0 | Status: SHIPPED | OUTPATIENT
Start: 2023-01-19

## 2023-02-09 ENCOUNTER — LAB ONLY (OUTPATIENT)
Dept: FAMILY MEDICINE CLINIC | Age: 33
End: 2023-02-09

## 2023-02-09 ENCOUNTER — HOSPITAL ENCOUNTER (OUTPATIENT)
Dept: LAB | Age: 33
Discharge: HOME | End: 2023-02-09

## 2023-02-09 DIAGNOSIS — E78.5 HYPERLIPIDEMIA LDL GOAL <100: ICD-10-CM

## 2023-02-09 DIAGNOSIS — E11.65 TYPE 2 DIABETES MELLITUS WITH HYPERGLYCEMIA, WITHOUT LONG-TERM CURRENT USE OF INSULIN (HCC): ICD-10-CM

## 2023-02-09 PROCEDURE — 80061 LIPID PANEL: CPT

## 2023-02-09 PROCEDURE — 80048 BASIC METABOLIC PNL TOTAL CA: CPT

## 2023-02-09 PROCEDURE — 36415 COLL VENOUS BLD VENIPUNCTURE: CPT

## 2023-02-09 PROCEDURE — 83036 HEMOGLOBIN GLYCOSYLATED A1C: CPT

## 2023-02-09 NOTE — PROGRESS NOTES
Pt arrived for scheduled lab appt. Confirmed pt's full name and . Lab collected per provider's orders. Pt tolerated well, ambulated out of clinic in stable condition.

## 2023-02-10 LAB
ANION GAP SERPL CALC-SCNC: 6 MMOL/L (ref 5–15)
BUN SERPL-MCNC: 17 MG/DL (ref 6–20)
BUN/CREAT SERPL: 29 (ref 12–20)
CALCIUM SERPL-MCNC: 9.6 MG/DL (ref 8.5–10.1)
CHLORIDE SERPL-SCNC: 103 MMOL/L (ref 97–108)
CHOLEST SERPL-MCNC: 244 MG/DL
CO2 SERPL-SCNC: 27 MMOL/L (ref 21–32)
CREAT SERPL-MCNC: 0.58 MG/DL (ref 0.55–1.02)
EST. AVERAGE GLUCOSE BLD GHB EST-MCNC: 154 MG/DL
GLUCOSE SERPL-MCNC: 105 MG/DL (ref 65–100)
HBA1C MFR BLD: 7 % (ref 4–5.6)
HDLC SERPL-MCNC: 70 MG/DL
HDLC SERPL: 3.5 (ref 0–5)
LDLC SERPL CALC-MCNC: 154.8 MG/DL (ref 0–100)
POTASSIUM SERPL-SCNC: 4.3 MMOL/L (ref 3.5–5.1)
SODIUM SERPL-SCNC: 136 MMOL/L (ref 136–145)
TRIGL SERPL-MCNC: 96 MG/DL (ref ?–150)
VLDLC SERPL CALC-MCNC: 19.2 MG/DL

## 2023-02-15 ENCOUNTER — OFFICE VISIT (OUTPATIENT)
Dept: FAMILY MEDICINE CLINIC | Age: 33
End: 2023-02-15

## 2023-02-15 VITALS
DIASTOLIC BLOOD PRESSURE: 90 MMHG | WEIGHT: 261.8 LBS | OXYGEN SATURATION: 100 % | HEART RATE: 88 BPM | SYSTOLIC BLOOD PRESSURE: 144 MMHG | BODY MASS INDEX: 41.09 KG/M2

## 2023-02-15 DIAGNOSIS — E11.65 TYPE 2 DIABETES MELLITUS WITH HYPERGLYCEMIA, WITHOUT LONG-TERM CURRENT USE OF INSULIN (HCC): Primary | ICD-10-CM

## 2023-02-15 DIAGNOSIS — E78.5 HYPERLIPIDEMIA LDL GOAL <100: ICD-10-CM

## 2023-02-15 PROCEDURE — 3051F HG A1C>EQUAL 7.0%<8.0%: CPT | Performed by: INTERNAL MEDICINE

## 2023-02-15 PROCEDURE — 99214 OFFICE O/P EST MOD 30 MIN: CPT | Performed by: INTERNAL MEDICINE

## 2023-02-15 NOTE — PROGRESS NOTES
Chief Complaint   Patient presents with    Diabetes     Visit Vitals  BP (!) 140/90 (BP 1 Location: Left upper arm, BP Patient Position: Sitting, BP Cuff Size: Adult)   Pulse 88   Wt 261 lb 12.8 oz (118.8 kg)   LMP 01/27/2023 (Exact Date)   SpO2 100%   BMI 41.09 kg/m²

## 2023-02-15 NOTE — PATIENT INSTRUCTIONS
1) Ponga 60 unidades en la noche antes de dormir y si russell azucar esta menos de 95 en la manana despues de 4 conti, entonces sigue tomando 60 unidades. Si russell azucar esta mas de 95 despues de 4 conti, entonces evert 65 unidades y puede aumentar russell dosis de insulina por 5 unidades cada 4 conti hasta russell azucar esta menos de 95.      2) Russell A1c esta mejor 7%. Prefiero que el nivel es menos de 6.5%. 3) Cassia rinones esta normal.    4) [place] russell presion y si russell nivel esta menos de 140/90, esta анна.

## 2023-02-15 NOTE — PROGRESS NOTES
Chief Complaint   Patient presents with    Diabetes     History of Present Illness: Emily Carbajal is a 26 y.o. female here for follow up of diabetes. Weight up 3 lbs since last visit in 10/22. Has increased her NPH to 55 units at bedtime for the past 2 months and fasting sugars are between 100-120 but has only seen a few under 95 and I explained the importance of keeping her fasting sugar under this value to improve her chance of conception. She is still taking lamisil and asked for a liver test to be checked next time and I wasn't aware this was needed as this was prescribed by Dr. Mel Garcia so I will order next time. Compliant with metformin 2 tabs bid. Was under some stress this morning with her child. Current Outpatient Medications   Medication Sig    terbinafine HCL (LAMISIL) 250 mg tablet Take 1 Tablet by mouth daily. Indications: athlete's foot, fungal infection of toenail    insulin NPH (NOVOLIN N, HUMULIN N) 100 unit/mL injection Inject 55 units at bedtime--dispense relion brand    metFORMIN ER (GLUCOPHAGE XR) 500 mg tablet 2 po bid ac breakfast and dinner for diabetes. Neli 2 tab antes de desayuno y 2 tab antes de la jassi para diabetes. Insulin Syringe-Needle U-100 1/2 mL 30 gauge syrg 1 Syringe by Does Not Apply route nightly. Use as directed    Blood-Glucose Meter monitoring kit Reli-On Prime. Needs test strips, lancing device, lancets please. No current facility-administered medications for this visit. No Known Allergies    Review of Systems: PER HPI    Physical Examination:  Blood pressure (!) 140/90, pulse 88, weight 261 lb 12.8 oz (118.8 kg), last menstrual period 01/27/2023, SpO2 100 %. --Repeat manually 144/90 in left arm.   General: pleasant, no distress, good eye contact   Neck: no carotid bruits  Cardiovascular: regular, normal rate, nl s1 and s2, no m/r/g,   Respiratory: clear bilaterally  Integumentary: no edema,   Psychiatric: normal mood and affect    Data Reviewed:   Component      Latest Ref Rng & Units 2/9/2023 2/9/2023 2/9/2023           9:02 AM  9:02 AM  9:02 AM   Sodium      136 - 145 mmol/L  136    Potassium      3.5 - 5.1 mmol/L  4.3    Chloride      97 - 108 mmol/L  103    CO2      21 - 32 mmol/L  27    Anion gap      5 - 15 mmol/L  6    Glucose      65 - 100 mg/dL  105 (H)    BUN      6 - 20 MG/DL  17    Creatinine      0.55 - 1.02 MG/DL  0.58    BUN/Creatinine ratio      12 - 20    29 (H)    eGFR      >60 ml/min/1.73m2  >60    Calcium      8.5 - 10.1 MG/DL  9.6    Cholesterol, total      <200 MG/DL   244 (H)   Triglyceride      <150 MG/DL   96   HDL Cholesterol      MG/DL   70   LDL, calculated      0 - 100 MG/DL   154.8 (H)   VLDL, calculated      MG/DL   19.2   CHOL/HDL Ratio      0.0 - 5.0     3.5   Hemoglobin A1c, (calculated)      4.0 - 5.6 % 7.0 (H)     Est. average glucose      mg/dL 154         Assessment/Plan:     1. Type 2 diabetes mellitus with hyperglycemia, without long-term current use of insulin ([facility]): her most recent Hgb A1c was 7% in 2/23 down from 8.2% in 10/22 up from 6.8% in 6/22 down from 7.3% in 2/22 down (1st visit with me) down from 11.1% in 10/21 and was just diagnosed with DM on 9/28/21. She desires pregnancy and I discussed that insulin and metformin is the safest regimen to use at this time. She needs better control of her fasting sugars so gave her a plan of how to titrate up her bedtime NPH to target a fasting sugar < 95.   - increase NPH to 60 units at bedtime  - cont metformin  mg 2 tabs bid  - check bs fasting and 2 hours after one of her meals per day  - microalbumin 74 in 10/21 down to 70 in 2/22 and 36 in 6/22--may need ACE in the future but will hold while considering pregnancy  - foot exam done 2/22  - check Hgb A1 and cmp and microalbumin prior to next visit         2. Hyperlipidemia LDL goal <100:  in 10/21 when diagnosed with diabetes and 151 in 6/22 and 154 in 2/23.   Will hold on any statins while trying to conceive  - check lipids prior to next visit            Patient Instructions   1) Ponga 60 unidades en la noche antes de dormir y si russell azucar esta menos de 95 en la manana despues de 4 conti, entonces sigue tomando 60 unidades. Si russell azucar esta mas de 95 despues de 4 conti, entonces evert 65 unidades y puede aumentar russell dosis de insulina por 5 unidades cada 4 conti hasta russell azucar esta menos de 95.      2) Russell A1c esta mejor 7%. Prefiero que el nivel es menos de 6.5%. 3) Cassia rinones esta normal.    4) [place] russell presion y si russell nivel esta menos de 140/90, esta анна.         Follow-up and Dispositions    Return 7/5/22 at 3:40pm.               Copy sent to:  Jose Davenport MD

## 2023-02-15 NOTE — PROGRESS NOTES
MATTY printed and with the assistance of Japanese interpretor, Darrin San, reviewed with patient. Patient advised that follow-up with Dr. Jaki Diaz is scheduled for 7/5 at 3:40pm.  Patient advised that she would be contacted a few weeks prior to face to face to schedule lab appointment. Patient indicated understanding and appreciated the service today.

## 2023-03-03 ENCOUNTER — HOSPITAL ENCOUNTER (OUTPATIENT)
Dept: LAB | Age: 33
Discharge: HOME | End: 2023-03-03

## 2023-03-03 ENCOUNTER — OFFICE VISIT (OUTPATIENT)
Dept: FAMILY MEDICINE CLINIC | Age: 33
End: 2023-03-03

## 2023-03-03 VITALS
HEART RATE: 87 BPM | BODY MASS INDEX: 41.91 KG/M2 | HEIGHT: 67 IN | TEMPERATURE: 99.5 F | SYSTOLIC BLOOD PRESSURE: 136 MMHG | DIASTOLIC BLOOD PRESSURE: 82 MMHG | WEIGHT: 267 LBS | OXYGEN SATURATION: 100 %

## 2023-03-03 DIAGNOSIS — N92.6 IRREGULAR MENSES: ICD-10-CM

## 2023-03-03 DIAGNOSIS — D50.0 IRON DEFICIENCY ANEMIA DUE TO CHRONIC BLOOD LOSS: Primary | ICD-10-CM

## 2023-03-03 DIAGNOSIS — N93.9 ABNORMAL UTERINE BLEEDING (AUB): ICD-10-CM

## 2023-03-03 DIAGNOSIS — Z13.0 SCREENING FOR DEFICIENCY ANEMIA: ICD-10-CM

## 2023-03-03 DIAGNOSIS — E11.65 TYPE 2 DIABETES MELLITUS WITH HYPERGLYCEMIA, WITHOUT LONG-TERM CURRENT USE OF INSULIN (HCC): ICD-10-CM

## 2023-03-03 LAB
BASOPHILS # BLD: 0.1 K/UL (ref 0–0.1)
BASOPHILS NFR BLD: 1 % (ref 0–1)
DIFFERENTIAL METHOD BLD: ABNORMAL
EOSINOPHIL # BLD: 0.2 K/UL (ref 0–0.4)
EOSINOPHIL NFR BLD: 3 % (ref 0–7)
ERYTHROCYTE [DISTWIDTH] IN BLOOD BY AUTOMATED COUNT: 18.6 % (ref 11.5–14.5)
GLUCOSE POC: 105 MG/DL
HCG URINE, QL. (POC): NEGATIVE
HCT VFR BLD AUTO: 33 % (ref 35–47)
HGB BLD-MCNC: 9.1 G/DL
HGB BLD-MCNC: 9.6 G/DL (ref 11.5–16)
IMM GRANULOCYTES # BLD AUTO: 0 K/UL (ref 0–0.04)
IMM GRANULOCYTES NFR BLD AUTO: 0 % (ref 0–0.5)
LYMPHOCYTES # BLD: 2.3 K/UL (ref 0.8–3.5)
LYMPHOCYTES NFR BLD: 30 % (ref 12–49)
MCH RBC QN AUTO: 19 PG (ref 26–34)
MCHC RBC AUTO-ENTMCNC: 29.1 G/DL (ref 30–36.5)
MCV RBC AUTO: 65.5 FL (ref 80–99)
MONOCYTES # BLD: 0.8 K/UL (ref 0–1)
MONOCYTES NFR BLD: 10 % (ref 5–13)
NEUTS SEG # BLD: 4.3 K/UL (ref 1.8–8)
NEUTS SEG NFR BLD: 56 % (ref 32–75)
NRBC # BLD: 0 K/UL (ref 0–0.01)
NRBC BLD-RTO: 0 PER 100 WBC
PLATELET # BLD AUTO: 396 K/UL (ref 150–400)
PMV BLD AUTO: 10 FL (ref 8.9–12.9)
RBC # BLD AUTO: 5.04 M/UL (ref 3.8–5.2)
RBC MORPH BLD: ABNORMAL
VALID INTERNAL CONTROL?: YES
WBC # BLD AUTO: 7.7 K/UL (ref 3.6–11)

## 2023-03-03 PROCEDURE — 99214 OFFICE O/P EST MOD 30 MIN: CPT | Performed by: NURSE PRACTITIONER

## 2023-03-03 PROCEDURE — 85018 HEMOGLOBIN: CPT | Performed by: NURSE PRACTITIONER

## 2023-03-03 PROCEDURE — 3051F HG A1C>EQUAL 7.0%<8.0%: CPT | Performed by: NURSE PRACTITIONER

## 2023-03-03 PROCEDURE — 82962 GLUCOSE BLOOD TEST: CPT | Performed by: NURSE PRACTITIONER

## 2023-03-03 PROCEDURE — 81025 URINE PREGNANCY TEST: CPT | Performed by: NURSE PRACTITIONER

## 2023-03-03 PROCEDURE — 85025 COMPLETE CBC W/AUTO DIFF WBC: CPT

## 2023-03-03 PROCEDURE — 36415 COLL VENOUS BLD VENIPUNCTURE: CPT

## 2023-03-03 RX ORDER — NORGESTIMATE AND ETHINYL ESTRADIOL 7DAYSX3 28
1 KIT ORAL DAILY
Qty: 3 DOSE PACK | Refills: 1 | Status: SHIPPED | OUTPATIENT
Start: 2023-03-03

## 2023-03-03 RX ORDER — LANOLIN ALCOHOL/MO/W.PET/CERES
325 CREAM (GRAM) TOPICAL
Qty: 90 TABLET | Refills: 0 | Status: SHIPPED | OUTPATIENT
Start: 2023-03-03 | End: 2023-09-29

## 2023-03-03 NOTE — PROGRESS NOTES
3/3/2023 : Gasper Chandler (: 1990) is a 26 y.o. female,  established patient, here for evaluation of the following chief complaint(s):  Irregular Menses (Patient c/o that she has been getting her cycles twice a month since 2023)     ASSESSMENT/PLAN:  Below is the assessment and plan developed based on review of pertinent history, physical exam, labs, studies, and medications. 1. Iron deficiency anemia due to chronic blood loss  -     ferrous sulfate 325 mg (65 mg iron) tablet; Take 1 Tablet by mouth every Monday, Wednesday, Friday for 210 days. Before breakfast., Normal, Disp-90 Tablet, R-0  2. Type 2 diabetes mellitus with hyperglycemia, without long-term current use of insulin (HCC)  -     AMB POC GLUCOSE BLOOD, BY GLUCOSE MONITORING DEVICE  3. Abnormal uterine bleeding (AUB)  -     AMB POC URINE PREGNANCY TEST, VISUAL COLOR COMPARISON  4. Screening for deficiency anemia  -     AMB POC HEMOGLOBIN (HGB)  -     CBC WITH AUTOMATED DIFF; Future  5. Irregular menses  -     norgestimate-ethinyl estradioL (ORTHO TRI-CYCLEN, TRI-SPRINTEC) 0.18/0.215/0.25 mg-35 mcg (28) tab; Take 1 Tablet by mouth daily. , Normal, Disp-3 Dose Pack, R-1  Will check cbc with diff today, treat with iron, may recheck cbc with diff at follow up. Return for 3-4 months LK return. SUBJECTIVE/OBJECTIVE:  HPI  She had just removed the arm implant 2021. Had no period until 2022 when she started having 2 periods a month. Normal periods prior to implant. No history of anemia. Felt dizzy last week, had sore throat. Has taken ibuprofen for her throat. Coughing. Coughing up mucus. Stuffy nose. Bleeds 3-4 days for each period with clots.   Results for orders placed or performed in visit on 23   AMB POC GLUCOSE BLOOD, BY GLUCOSE MONITORING DEVICE   Result Value Ref Range    Glucose  MG/DL   AMB POC HEMOGLOBIN (HGB)   Result Value Ref Range    Hemoglobin (POC) 9.1 G/DL AMB POC URINE PREGNANCY TEST, VISUAL COLOR COMPARISON   Result Value Ref Range    VALID INTERNAL CONTROL POC Yes     HCG urine, Ql. (POC) Negative Negative     Current Medications:   Current Outpatient Medications   Medication Sig    ferrous sulfate 325 mg (65 mg iron) tablet Take 1 Tablet by mouth every Monday, Wednesday, Friday for 210 days. Before breakfast.    norgestimate-ethinyl estradioL (ORTHO TRI-CYCLEN, TRI-SPRINTEC) 0.18/0.215/0.25 mg-35 mcg (28) tab Take 1 Tablet by mouth daily. insulin NPH (NOVOLIN N, HUMULIN N) 100 unit/mL injection Inject 60 units at bedtime--dispense relion brand    terbinafine HCL (LAMISIL) 250 mg tablet Take 1 Tablet by mouth daily. Indications: athlete's foot, fungal infection of toenail    metFORMIN ER (GLUCOPHAGE XR) 500 mg tablet 2 po bid ac breakfast and dinner for diabetes. Neli 2 tab antes de desayuno y 2 tab antes de la jassi para diabetes. Insulin Syringe-Needle U-100 1/2 mL 30 gauge syrg 1 Syringe by Does Not Apply route nightly. Use as directed    Blood-Glucose Meter monitoring kit Reli-On Prime. Needs test strips, lancing device, lancets please. Review of Systems: Negative for: fever, chest pain, shortness of breath, leg swelling. Social History:  reports that she quit smoking about 5 years ago. Her smoking use included cigarettes. She has never used smokeless tobacco. She reports that she does not currently use alcohol after a past usage of about 1.0 standard drink per week. She reports that she does not use drugs. Physical Examination: Patient's last menstrual period was 02/22/2023. Blood pressure 136/82, pulse 87, temperature 99.5 °F (37.5 °C), temperature source Temporal, height 5' 6.93\" (1.7 m), weight 267 lb (121.1 kg), last menstrual period 02/22/2023, SpO2 100 %. General appearance - well developed, no acute distress. Lungs - clear to auscultation. Heart - regular rate and rhythm without murmurs, rubs, or gallops.   Abdomen - bowel sounds present x 4, NT, ND  Extremities - no CCE. An electronic signature was used to authenticate this note.   -- Mqiuel Cardoza, NP

## 2023-03-03 NOTE — PROGRESS NOTES
Patient discharged with AVS. Name and date of birth verified. GoodRx coupon given for the pharmacy. Made aware of prescriptions sent to the pharmacy. Medication review completed. Instructed to schedule a follow up appointment in 3-4 months. Given a lab requisition and instructed to have ordered lab completed today in the laboratory on the 1st floor, [address]. Patient was given an opportunity to voice questions/concerns. All questions were addressed. [facility] interpretor #[id number] assisted.

## 2023-03-03 NOTE — PROGRESS NOTES
Coordination of Care  1. Have you been to the ER, urgent care clinic since your last visit? Hospitalized since your last visit? No    2. Have you seen or consulted any other health care providers outside of the [address] [place] [address] since your last visit? Include any pap smears or colon screening. No    Does the patient need refills? NO    Learning Assessment Complete? yes  Depression Screening complete in the past 12 months? yes  Results for orders placed or performed in visit on 03/03/23   AMB POC GLUCOSE BLOOD, BY GLUCOSE MONITORING DEVICE   Result Value Ref Range    Glucose  MG/DL   AMB POC HEMOGLOBIN (HGB)   Result Value Ref Range    Hemoglobin (POC) 9.1 G/DL     Non fasting results.

## 2023-05-24 RX ORDER — METFORMIN HYDROCHLORIDE 500 MG/1
TABLET, EXTENDED RELEASE ORAL
COMMUNITY
Start: 2023-04-13

## 2023-05-24 RX ORDER — TERBINAFINE HYDROCHLORIDE 250 MG/1
250 TABLET ORAL DAILY
COMMUNITY
Start: 2023-04-13 | End: 2023-07-05

## 2023-05-24 RX ORDER — FERROUS SULFATE 325(65) MG
325 TABLET ORAL
COMMUNITY
Start: 2023-03-03 | End: 2023-07-25 | Stop reason: SDUPTHER

## 2023-05-24 RX ORDER — NORGESTIMATE AND ETHINYL ESTRADIOL 7DAYSX3 28
1 KIT ORAL DAILY
COMMUNITY
Start: 2023-03-03 | End: 2023-07-05

## 2023-05-31 RX ORDER — TERBINAFINE HYDROCHLORIDE 250 MG/1
TABLET ORAL
Qty: 30 TABLET | Refills: 0 | OUTPATIENT
Start: 2023-05-31

## 2023-06-20 RX ORDER — TERBINAFINE HYDROCHLORIDE 250 MG/1
TABLET ORAL
Qty: 30 TABLET | Refills: 0 | OUTPATIENT
Start: 2023-06-20

## 2023-06-28 ENCOUNTER — NURSE ONLY (OUTPATIENT)
Age: 33
End: 2023-06-28

## 2023-06-28 ENCOUNTER — HOSPITAL ENCOUNTER (OUTPATIENT)
Facility: HOSPITAL | Age: 33
Setting detail: SPECIMEN
Discharge: HOME | End: 2023-07-01

## 2023-06-28 DIAGNOSIS — E11.65 TYPE 2 DIABETES MELLITUS WITH HYPERGLYCEMIA, WITHOUT LONG-TERM CURRENT USE OF INSULIN (HCC): Primary | ICD-10-CM

## 2023-06-28 LAB
ALBUMIN SERPL-MCNC: 3.6 G/DL (ref 3.5–5)
ALBUMIN/GLOB SERPL: 1.1 (ref 1.1–2.2)
ALP SERPL-CCNC: 116 U/L (ref 45–117)
ALT SERPL-CCNC: 41 U/L (ref 12–78)
ANION GAP SERPL CALC-SCNC: 9 MMOL/L (ref 5–15)
AST SERPL-CCNC: 19 U/L (ref 15–37)
BILIRUB SERPL-MCNC: 0.2 MG/DL (ref 0.2–1)
BUN SERPL-MCNC: 13 MG/DL (ref 6–20)
BUN/CREAT SERPL: 23 (ref 12–20)
CALCIUM SERPL-MCNC: 9.1 MG/DL (ref 8.5–10.1)
CHLORIDE SERPL-SCNC: 103 MMOL/L (ref 97–108)
CO2 SERPL-SCNC: 26 MMOL/L (ref 21–32)
CREAT SERPL-MCNC: 0.57 MG/DL (ref 0.55–1.02)
CREAT UR-MCNC: 33.6 MG/DL
GLOBULIN SER CALC-MCNC: 3.4 G/DL (ref 2–4)
GLUCOSE SERPL-MCNC: 104 MG/DL (ref 65–100)
MICROALBUMIN UR-MCNC: 2.04 MG/DL
MICROALBUMIN/CREAT UR-RTO: 61 MG/G (ref 0–30)
POTASSIUM SERPL-SCNC: 3.9 MMOL/L (ref 3.5–5.1)
PROT SERPL-MCNC: 7 G/DL (ref 6.4–8.2)
SODIUM SERPL-SCNC: 138 MMOL/L (ref 136–145)

## 2023-06-28 PROCEDURE — 82043 UR ALBUMIN QUANTITATIVE: CPT

## 2023-06-28 PROCEDURE — 83036 HEMOGLOBIN GLYCOSYLATED A1C: CPT

## 2023-06-28 PROCEDURE — 80053 COMPREHEN METABOLIC PANEL: CPT

## 2023-06-28 PROCEDURE — 36415 COLL VENOUS BLD VENIPUNCTURE: CPT

## 2023-06-28 PROCEDURE — 82570 ASSAY OF URINE CREATININE: CPT

## 2023-06-29 LAB
EST. AVERAGE GLUCOSE BLD GHB EST-MCNC: 134 MG/DL
HBA1C MFR BLD: 6.3 % (ref 4–5.6)

## 2023-07-05 ENCOUNTER — OFFICE VISIT (OUTPATIENT)
Age: 33
End: 2023-07-05

## 2023-07-05 VITALS
OXYGEN SATURATION: 98 % | TEMPERATURE: 98 F | HEART RATE: 84 BPM | DIASTOLIC BLOOD PRESSURE: 90 MMHG | BODY MASS INDEX: 40.9 KG/M2 | SYSTOLIC BLOOD PRESSURE: 148 MMHG | WEIGHT: 260.6 LBS

## 2023-07-05 DIAGNOSIS — Z79.4 TYPE 2 DIABETES MELLITUS WITH HYPERGLYCEMIA, WITH LONG-TERM CURRENT USE OF INSULIN (HCC): Primary | ICD-10-CM

## 2023-07-05 DIAGNOSIS — E11.65 TYPE 2 DIABETES MELLITUS WITH HYPERGLYCEMIA, WITH LONG-TERM CURRENT USE OF INSULIN (HCC): Primary | ICD-10-CM

## 2023-07-05 DIAGNOSIS — E78.2 MIXED HYPERLIPIDEMIA: ICD-10-CM

## 2023-07-05 DIAGNOSIS — B35.1 ONYCHOMYCOSIS: ICD-10-CM

## 2023-07-05 PROCEDURE — 3044F HG A1C LEVEL LT 7.0%: CPT | Performed by: INTERNAL MEDICINE

## 2023-07-05 PROCEDURE — 99214 OFFICE O/P EST MOD 30 MIN: CPT | Performed by: INTERNAL MEDICINE

## 2023-07-05 RX ORDER — TERBINAFINE HYDROCHLORIDE 250 MG/1
250 TABLET ORAL DAILY
Qty: 30 TABLET | Refills: 4 | Status: SHIPPED | OUTPATIENT
Start: 2023-07-05

## 2023-07-05 NOTE — PATIENT INSTRUCTIONS
1) Newby A1c (examen de 3 meses de azucar) esta 6.3% y prefiero que el nivel esta menos de 6.5%. Mantenga rogelio dosis de NPH a 60 unidades. 2) Rogelio rinones y higado esta normal.  Puede heide mas lamisil (terbenafine) para las unas natan si esta embarazada, para la medicacion imediatamente. 3) Valentine menos cafe para ayudar newby presion y protiean en newby urino.

## 2023-07-25 ENCOUNTER — OFFICE VISIT (OUTPATIENT)
Age: 33
End: 2023-07-25

## 2023-07-25 VITALS
HEART RATE: 73 BPM | TEMPERATURE: 97.7 F | DIASTOLIC BLOOD PRESSURE: 82 MMHG | SYSTOLIC BLOOD PRESSURE: 123 MMHG | HEIGHT: 67 IN | WEIGHT: 261 LBS | BODY MASS INDEX: 40.97 KG/M2 | OXYGEN SATURATION: 100 %

## 2023-07-25 DIAGNOSIS — E11.65 TYPE 2 DIABETES MELLITUS WITH HYPERGLYCEMIA, WITH LONG-TERM CURRENT USE OF INSULIN (HCC): Primary | ICD-10-CM

## 2023-07-25 DIAGNOSIS — Z79.4 TYPE 2 DIABETES MELLITUS WITH HYPERGLYCEMIA, WITH LONG-TERM CURRENT USE OF INSULIN (HCC): Primary | ICD-10-CM

## 2023-07-25 DIAGNOSIS — D50.9 IRON DEFICIENCY ANEMIA, UNSPECIFIED IRON DEFICIENCY ANEMIA TYPE: ICD-10-CM

## 2023-07-25 LAB
GLUCOSE, POC: 121 MG/DL
HEMOGLOBIN, POC: 12.5 G/DL

## 2023-07-25 PROCEDURE — 82962 GLUCOSE BLOOD TEST: CPT | Performed by: NURSE PRACTITIONER

## 2023-07-25 PROCEDURE — 3044F HG A1C LEVEL LT 7.0%: CPT | Performed by: NURSE PRACTITIONER

## 2023-07-25 PROCEDURE — 99213 OFFICE O/P EST LOW 20 MIN: CPT | Performed by: NURSE PRACTITIONER

## 2023-07-25 PROCEDURE — 85018 HEMOGLOBIN: CPT | Performed by: NURSE PRACTITIONER

## 2023-07-25 RX ORDER — FERROUS SULFATE 325(65) MG
325 TABLET ORAL
Qty: 30 TABLET | Refills: 11 | Status: SHIPPED | OUTPATIENT
Start: 2023-07-25

## 2023-07-25 ASSESSMENT — PATIENT HEALTH QUESTIONNAIRE - PHQ9
SUM OF ALL RESPONSES TO PHQ QUESTIONS 1-9: 0
2. FEELING DOWN, DEPRESSED OR HOPELESS: 0
SUM OF ALL RESPONSES TO PHQ QUESTIONS 1-9: 0
SUM OF ALL RESPONSES TO PHQ9 QUESTIONS 1 & 2: 0
1. LITTLE INTEREST OR PLEASURE IN DOING THINGS: 0

## 2023-07-25 NOTE — PROGRESS NOTES
2023 : Kal Morton (: 1990) is a 26 y.o. female, established patient, here for evaluation of the following chief complaint(s):  Diabetes (F/u) and Hyperlipidemia (F/u)  Needs GYN, would like to become pregnant. ASSESSMENT/PLAN:  Below is the assessment and plan developed based on review of pertinent history, physical exam, labs, studies, and medications. 1. Type 2 diabetes mellitus with hyperglycemia, with long-term current use of insulin (HCC)  -     AMB POC GLUCOSE BLOOD, BY GLUCOSE MONITORING DEVICE  -     AMB POC HEMOGLOBIN (HGB)  2. Iron deficiency anemia, unspecified iron deficiency anemia type  Comments:  23, improved. Anemia has improved. Dr. Jazmín Gamboa has ordered labs including lipid panel. She will return 1 week before her appointment for these labs. Not having heavy periods anymore. It is monthly now after taking the contraceptives. She may continue to take iron. Discussed that now that she is having regular periods and not losing a lot of blood, she is less likely to become anemic. Glycemic control is Better. Return for [facility] clinic.     SUBJECTIVE/OBJECTIVE:  HPI   Results for orders placed or performed in visit on 23   AMB POC GLUCOSE BLOOD, BY GLUCOSE MONITORING DEVICE   Result Value Ref Range    Glucose,  MG/DL   AMB POC HEMOGLOBIN (HGB)   Result Value Ref Range    Hemoglobin, POC 12.5 G/DL     Latest Lab Result Choices: No results found for: HBA1C, GLU, GESTF, MCA2, MCAU, LDL, LDLC, JULI, CREAPOC, CREA   Hemoglobin A1C   Date Value Ref Range Status   2023 6.3 (H) 4.0 - 5.6 % Final     Comment:     NEW METHOD  PLEASE NOTE NEW REFERENCE RANGE  (NOTE)  HbA1C Interpretive Ranges  <5.7              Normal  5.7 - 6.4         Consider Prediabetes  >6.5              Consider Diabetes     2023 7.0 (H) 4.0 - 5.6 % Final     Comment:     NEW METHOD  PLEASE NOTE NEW REFERENCE RANGE  (NOTE)  HbA1C

## 2023-07-25 NOTE — PROGRESS NOTES
Coordination of Care  1. Have you been to the ER, urgent care clinic since your last visit? Hospitalized since your last visit? No    2. Have you seen or consulted any other health care providers outside of the [address] [place] [address] since your last visit? Include any pap smears or colon screening. No  Does the patient need refills?no    Learning Assessment Complete?  yes  Depression Screening complete in the past 12 months? yes  Results for orders placed or performed in visit on 07/25/23   AMB POC GLUCOSE BLOOD, BY GLUCOSE MONITORING DEVICE   Result Value Ref Range    Glucose,  MG/DL   AMB POC HEMOGLOBIN (HGB)   Result Value Ref Range    Hemoglobin, POC 12.5 G/DL    Fasting results

## 2023-07-30 DIAGNOSIS — D50.9 IRON DEFICIENCY ANEMIA, UNSPECIFIED IRON DEFICIENCY ANEMIA TYPE: ICD-10-CM

## 2023-08-07 RX ORDER — FERROUS SULFATE 325(65) MG
325 TABLET ORAL
Qty: 30 TABLET | Refills: 11 | OUTPATIENT
Start: 2023-08-07

## 2023-09-19 ENCOUNTER — OFFICE VISIT (OUTPATIENT)
Age: 33
End: 2023-09-19

## 2023-09-19 ENCOUNTER — HOSPITAL ENCOUNTER (OUTPATIENT)
Facility: HOSPITAL | Age: 33
Setting detail: SPECIMEN
Discharge: HOME | End: 2023-09-22

## 2023-09-19 VITALS
BODY MASS INDEX: 41.44 KG/M2 | SYSTOLIC BLOOD PRESSURE: 143 MMHG | OXYGEN SATURATION: 100 % | WEIGHT: 264 LBS | TEMPERATURE: 97.5 F | HEART RATE: 80 BPM | DIASTOLIC BLOOD PRESSURE: 93 MMHG

## 2023-09-19 DIAGNOSIS — Z31.9 PATIENT DESIRES PREGNANCY: ICD-10-CM

## 2023-09-19 DIAGNOSIS — Z01.419 ENCOUNTER FOR WELL WOMAN EXAM: Primary | ICD-10-CM

## 2023-09-19 DIAGNOSIS — N93.8 DUB (DYSFUNCTIONAL UTERINE BLEEDING): ICD-10-CM

## 2023-09-19 PROCEDURE — 99213 OFFICE O/P EST LOW 20 MIN: CPT | Performed by: PHYSICIAN ASSISTANT

## 2023-09-19 PROCEDURE — 88175 CYTOPATH C/V AUTO FLUID REDO: CPT

## 2023-09-19 PROCEDURE — 87624 HPV HI-RISK TYP POOLED RSLT: CPT

## 2023-09-19 RX ORDER — VITAMIN A ACETATE, BETA CAROTENE, ASCORBIC ACID, CHOLECALCIFEROL, .ALPHA.-TOCOPHEROL ACETATE, DL-, THIAMINE MONONITRATE, RIBOFLAVIN, NIACINAMIDE, PYRIDOXINE HYDROCHLORIDE, FOLIC ACID, CYANOCOBALAMIN, CALCIUM CARBONATE, FERROUS FUMARATE, ZINC OXIDE, CUPRIC OXIDE 3080; 12; 120; 400; 1; 1.84; 3; 20; 22; 920; 25; 200; 27; 10; 2 [IU]/1; UG/1; MG/1; [IU]/1; MG/1; MG/1; MG/1; MG/1; MG/1; [IU]/1; MG/1; MG/1; MG/1; MG/1; MG/1
TABLET, FILM COATED ORAL
Qty: 90 TABLET | Refills: 3 | Status: SHIPPED | OUTPATIENT
Start: 2023-09-19

## 2023-09-19 SDOH — ECONOMIC STABILITY: FOOD INSECURITY: WITHIN THE PAST 12 MONTHS, THE FOOD YOU BOUGHT JUST DIDN'T LAST AND YOU DIDN'T HAVE MONEY TO GET MORE.: NEVER TRUE

## 2023-09-19 SDOH — ECONOMIC STABILITY: FOOD INSECURITY: WITHIN THE PAST 12 MONTHS, YOU WORRIED THAT YOUR FOOD WOULD RUN OUT BEFORE YOU GOT MONEY TO BUY MORE.: NEVER TRUE

## 2023-09-19 SDOH — ECONOMIC STABILITY: HOUSING INSECURITY
IN THE LAST 12 MONTHS, WAS THERE A TIME WHEN YOU DID NOT HAVE A STEADY PLACE TO SLEEP OR SLEPT IN A SHELTER (INCLUDING NOW)?: NO

## 2023-09-19 SDOH — ECONOMIC STABILITY: INCOME INSECURITY: HOW HARD IS IT FOR YOU TO PAY FOR THE VERY BASICS LIKE FOOD, HOUSING, MEDICAL CARE, AND HEATING?: SOMEWHAT HARD

## 2023-09-19 ASSESSMENT — SOCIAL DETERMINANTS OF HEALTH (SDOH)
WITHIN THE LAST YEAR, HAVE YOU BEEN KICKED, HIT, SLAPPED, OR OTHERWISE PHYSICALLY HURT BY YOUR PARTNER OR EX-PARTNER?: NO
WITHIN THE LAST YEAR, HAVE YOU BEEN AFRAID OF YOUR PARTNER OR EX-PARTNER?: NO
WITHIN THE LAST YEAR, HAVE YOU BEEN HUMILIATED OR EMOTIONALLY ABUSED IN OTHER WAYS BY YOUR PARTNER OR EX-PARTNER?: NO
WITHIN THE LAST YEAR, HAVE TO BEEN RAPED OR FORCED TO HAVE ANY KIND OF SEXUAL ACTIVITY BY YOUR PARTNER OR EX-PARTNER?: NO

## 2023-09-19 ASSESSMENT — PATIENT HEALTH QUESTIONNAIRE - PHQ9
1. LITTLE INTEREST OR PLEASURE IN DOING THINGS: 0
SUM OF ALL RESPONSES TO PHQ QUESTIONS 1-9: 0
2. FEELING DOWN, DEPRESSED OR HOPELESS: 0
SUM OF ALL RESPONSES TO PHQ QUESTIONS 1-9: 0
SUM OF ALL RESPONSES TO PHQ9 QUESTIONS 1 & 2: 0

## 2023-09-19 NOTE — PROGRESS NOTES
Assessment/Plan:    Joe Caraballo was seen today for gynecologic exam.    Diagnoses and all orders for this visit:    Encounter for well woman exam  -     PAP IG, Aptima HPV and rfx 16/18,45 ([id number]); Future    DUB (dysfunctional uterine bleeding)  -     US PELVIS COMPLETE; Future  -     US NON OB TRANSVAGINAL; Future    Patient desires pregnancy  -     Prenatal Vit-Fe Fumarate-FA (PRENATAL PLUS) 27-1 MG TABS; Si po daily    One month f/up   US on day 4 after next period  Start PNV with folic acid  Basal body temp method of predicting ovulation, she is having irregular periods     No follow-up provider specified. CHARLES Claros expressed understanding of this plan. An AVS was printed and given to the patient.      ----------------------------------------------------------------------    Chief Complaint   Patient presents with    Gynecologic Exam       History of Present Illness:  Pt presents for well woman exam   when she was 14years old- her son is now 25years old  She has been trying for pregnancy for 18 months. She has periods that come between 31-37 days (these are the last 3 months that she has tracked). She has been using an ovulation predictor janina for 8 months and never got pregnant.  After reviewing her info, the janina does not take into effect her irregular periods  She is diabetic on insulin and metformin  She was advised to not wait if she and her  are considering seeing an infertility specialist due to her age  No hx of abnl pap  No risk for DV        Past Medical History:   Diagnosis Date    Obesity     Type 2 diabetes mellitus ([address]) 10/2021       Current Outpatient Medications   Medication Sig Dispense Refill    Prenatal Vit-Fe Fumarate-FA (PRENATAL PLUS) 27-1 MG TABS Si po daily 90 tablet 3    terbinafine (LAMISIL) 250 MG tablet Take 1 tablet by mouth daily 30 tablet 4    insulin NPH (HUMULIN N;NOVOLIN N) 100 UNIT/ML injection vial Inject 60 units at

## 2023-09-19 NOTE — PROGRESS NOTES
RN informed patient when to call to make ultrasound appointment. Patient verbalized understanding. An After Visit Summary was printed and given to the patient.

## 2023-09-19 NOTE — PROGRESS NOTES
[facility]    When was your last pap smear and where? 2020    Any abnormal results from previous pap smears? no    Any problems with your breasts? no    Any family history of breast/ovarian cancer? no    Do you have any kids? yes    Oldest 25 youngest 0    Are you sexually active? yes    Are you using any type of birth control? If yes where do you go for this? no    Abuse screening completed this visit?  yes

## 2023-10-03 ENCOUNTER — HOSPITAL ENCOUNTER (OUTPATIENT)
Facility: HOSPITAL | Age: 33
Discharge: HOME | End: 2023-10-06

## 2023-10-03 DIAGNOSIS — N93.8 DUB (DYSFUNCTIONAL UTERINE BLEEDING): ICD-10-CM

## 2023-10-03 PROCEDURE — 76830 TRANSVAGINAL US NON-OB: CPT

## 2023-10-03 PROCEDURE — 76856 US EXAM PELVIC COMPLETE: CPT

## 2023-10-17 ENCOUNTER — OFFICE VISIT (OUTPATIENT)
Age: 33
End: 2023-10-17

## 2023-10-17 VITALS
OXYGEN SATURATION: 96 % | WEIGHT: 264 LBS | BODY MASS INDEX: 41.44 KG/M2 | SYSTOLIC BLOOD PRESSURE: 144 MMHG | HEART RATE: 80 BPM | TEMPERATURE: 97.3 F | DIASTOLIC BLOOD PRESSURE: 94 MMHG

## 2023-10-17 DIAGNOSIS — N93.8 DUB (DYSFUNCTIONAL UTERINE BLEEDING): ICD-10-CM

## 2023-10-17 DIAGNOSIS — Z31.9 PATIENT DESIRES PREGNANCY: ICD-10-CM

## 2023-10-17 DIAGNOSIS — E28.2 PCOS (POLYCYSTIC OVARIAN SYNDROME): Primary | ICD-10-CM

## 2023-10-17 DIAGNOSIS — Z23 ENCOUNTER FOR ADMINISTRATION OF VACCINE: ICD-10-CM

## 2023-10-17 PROCEDURE — 90471 IMMUNIZATION ADMIN: CPT | Performed by: PHYSICIAN ASSISTANT

## 2023-10-17 PROCEDURE — 90686 IIV4 VACC NO PRSV 0.5 ML IM: CPT | Performed by: PHYSICIAN ASSISTANT

## 2023-10-17 PROCEDURE — 99213 OFFICE O/P EST LOW 20 MIN: CPT | Performed by: PHYSICIAN ASSISTANT

## 2023-10-17 ASSESSMENT — PATIENT HEALTH QUESTIONNAIRE - PHQ9
SUM OF ALL RESPONSES TO PHQ QUESTIONS 1-9: 0
SUM OF ALL RESPONSES TO PHQ QUESTIONS 1-9: 0
SUM OF ALL RESPONSES TO PHQ9 QUESTIONS 1 & 2: 0
2. FEELING DOWN, DEPRESSED OR HOPELESS: 0
SUM OF ALL RESPONSES TO PHQ QUESTIONS 1-9: 0
1. LITTLE INTEREST OR PLEASURE IN DOING THINGS: 0
SUM OF ALL RESPONSES TO PHQ QUESTIONS 1-9: 0

## 2023-10-17 NOTE — PROGRESS NOTES
Assessment/Plan:    Tanner Dejesus was seen today for results and immunizations. Diagnoses and all orders for this visit:    PCOS (polycystic ovarian syndrome)  -     US PELVIS COMPLETE; Future  -     US NON OB TRANSVAGINAL; Future  One cyst 9 cm, one 5 cm bilateral ovarian cysts present    DUB (dysfunctional uterine bleeding)  Had spotting on 23    Patient desires pregnancy  On PNV. Difficult situation due to PCOS    Encounter for administration of vaccine  -     Influenza, FLUARIX, (age 10 mo+),  IM, Preservative Free, 0.5 mL      One month f up  No follow-up provider specified. CHARLES Lugo Signs expressed understanding of this plan. An AVS was printed and given to the patient.      ----------------------------------------------------------------------    Chief Complaint   Patient presents with    Results     US results    Immunizations     FLU vaccine       History of Present Illness:  Pt presents for f/up after pelvic ultrasound and to discuss plan  She will need a 6 week f/up ultrasound to confirm resolution of the 9 and 5 cm cysts, which is actually next week  We had a very indepth discussion today about her desire for pregnancy and her PCOS. She is on insulin and metformin, both of which should be beneficial to her PCOS but in spite of this she still had a 9cm ovarian cyst. The discussion turned to lifestyle changes. She admits that she is not following any specific dietary guidelines- that at times she would be excited to do this but would slide back into old habits. Her  now has FARRIS and he too has gained a lot of weight. She thinks that he would be supportive of the pt making healthy lifestyle changes.        Past Medical History:   Diagnosis Date    Obesity     Type 2 diabetes mellitus ([address]) 10/2021       Current Outpatient Medications   Medication Sig Dispense Refill    Prenatal Vit-Fe Fumarate-FA (PRENATAL PLUS) 27-1 MG TABS Si po daily 90 tablet 3

## 2023-10-17 NOTE — PROGRESS NOTES
Kelly Avitia seen at discharge. Full name and  verified; After visit Summary was given. RN reviewed today's visit with patient, as well as instructions on when it is recommended to return for follow-up visit. RN reviewed the provider's instructions with the patient, answering all questions to her satisfaction. Patient verbalized understanding. Patient was assisted in scheduling her imaging appointment for her ultrasound (pelvis/transvaginal) ordered today. Patient was informed that these services are NOT free. Care Card process was explained to patient, including that she might receive a bill. Patient was informed that once bill is received that she is to set up an appt with our O.W to start on the financial assistance application that is based on the patient's income. Patient wishes to speak to  regarding hospital bills but does not have time today. RN advised patient to schedule an appointment with  for a later date/time. Patient verbalized understanding. Patient requests flu vaccine. Provider cleared patient to receive vaccines today. Consent form completed. Patient denies fever, egg allergy, or reactions to any past immunization. Immunization given per protocol and recorded in [address] [place]. VIS information sheet given, explained possible side effects. Reviewed signs/symptoms indicating need to be seen in ER. Vaccine administered in right deltoid. Patient had no adverse reaction at time of discharge.  used during this encounter.   Crystal Donato RN

## 2023-10-17 NOTE — PROGRESS NOTES
Coordination of Care  1. Have you been to the ER, urgent care clinic since your last visit? Hospitalized since your last visit? No    2. Have you seen or consulted any other health care providers outside of the [address] [place] [address] since your last visit? Include any pap smears or colon screening. No  Does the patient need refills? Yes    Learning Assessment Complete?  yes  Depression Screening complete in the past 12 months? yes

## 2023-10-26 ENCOUNTER — NURSE ONLY (OUTPATIENT)
Age: 33
End: 2023-10-26

## 2023-10-26 ENCOUNTER — HOSPITAL ENCOUNTER (OUTPATIENT)
Facility: HOSPITAL | Age: 33
Setting detail: SPECIMEN
Discharge: HOME | End: 2023-10-29

## 2023-10-26 DIAGNOSIS — Z13.9 ENCOUNTER FOR SCREENING: ICD-10-CM

## 2023-10-26 DIAGNOSIS — Z13.9 ENCOUNTER FOR SCREENING: Primary | ICD-10-CM

## 2023-10-26 LAB
CREAT UR-MCNC: 58.8 MG/DL
MICROALBUMIN UR-MCNC: 2.48 MG/DL
MICROALBUMIN/CREAT UR-RTO: 42 MG/G (ref 0–30)

## 2023-10-26 PROCEDURE — 82570 ASSAY OF URINE CREATININE: CPT

## 2023-10-26 PROCEDURE — 82043 UR ALBUMIN QUANTITATIVE: CPT

## 2023-10-26 PROCEDURE — 36415 COLL VENOUS BLD VENIPUNCTURE: CPT

## 2023-10-26 PROCEDURE — 80053 COMPREHEN METABOLIC PANEL: CPT

## 2023-10-26 PROCEDURE — 80061 LIPID PANEL: CPT

## 2023-10-26 PROCEDURE — 83036 HEMOGLOBIN GLYCOSYLATED A1C: CPT

## 2023-10-26 NOTE — PROGRESS NOTES
Patient arrived for scheduled lab draw. Confirmed name and . Labs drawn per provider's orders, patient tolerated well. Patient ambulated out of clinic in stable condition.

## 2023-10-27 ENCOUNTER — HOSPITAL ENCOUNTER (OUTPATIENT)
Facility: HOSPITAL | Age: 33
Discharge: HOME | End: 2023-10-27

## 2023-10-27 ENCOUNTER — HOSPITAL ENCOUNTER (OUTPATIENT)
Facility: HOSPITAL | Age: 33
End: 2023-10-27

## 2023-10-27 DIAGNOSIS — E28.2 PCOS (POLYCYSTIC OVARIAN SYNDROME): ICD-10-CM

## 2023-10-27 LAB
ALBUMIN SERPL-MCNC: 3.7 G/DL (ref 3.5–5)
ALBUMIN/GLOB SERPL: 1 (ref 1.1–2.2)
ALP SERPL-CCNC: 108 U/L (ref 45–117)
ALT SERPL-CCNC: 68 U/L (ref 12–78)
ANION GAP SERPL CALC-SCNC: 6 MMOL/L (ref 5–15)
AST SERPL-CCNC: 54 U/L (ref 15–37)
BILIRUB SERPL-MCNC: 0.3 MG/DL (ref 0.2–1)
BUN SERPL-MCNC: 10 MG/DL (ref 6–20)
BUN/CREAT SERPL: 19 (ref 12–20)
CALCIUM SERPL-MCNC: 8.9 MG/DL (ref 8.5–10.1)
CHLORIDE SERPL-SCNC: 104 MMOL/L (ref 97–108)
CHOLEST SERPL-MCNC: 247 MG/DL
CO2 SERPL-SCNC: 25 MMOL/L (ref 21–32)
CREAT SERPL-MCNC: 0.54 MG/DL (ref 0.55–1.02)
EST. AVERAGE GLUCOSE BLD GHB EST-MCNC: 151 MG/DL
GLOBULIN SER CALC-MCNC: 3.7 G/DL (ref 2–4)
GLUCOSE SERPL-MCNC: 83 MG/DL (ref 65–100)
HBA1C MFR BLD: 6.9 % (ref 4–5.6)
HDLC SERPL-MCNC: 69 MG/DL
HDLC SERPL: 3.6 (ref 0–5)
LDLC SERPL CALC-MCNC: 156 MG/DL (ref 0–100)
POTASSIUM SERPL-SCNC: 3.8 MMOL/L (ref 3.5–5.1)
PROT SERPL-MCNC: 7.4 G/DL (ref 6.4–8.2)
SODIUM SERPL-SCNC: 135 MMOL/L (ref 136–145)
TRIGL SERPL-MCNC: 110 MG/DL
VLDLC SERPL CALC-MCNC: 22 MG/DL

## 2023-10-27 PROCEDURE — 76856 US EXAM PELVIC COMPLETE: CPT

## 2023-10-27 PROCEDURE — 76830 TRANSVAGINAL US NON-OB: CPT

## 2023-11-01 ENCOUNTER — OFFICE VISIT (OUTPATIENT)
Age: 33
End: 2023-11-01

## 2023-11-01 VITALS
DIASTOLIC BLOOD PRESSURE: 82 MMHG | WEIGHT: 260 LBS | HEART RATE: 85 BPM | BODY MASS INDEX: 40.81 KG/M2 | SYSTOLIC BLOOD PRESSURE: 122 MMHG | TEMPERATURE: 97.5 F | OXYGEN SATURATION: 98 %

## 2023-11-01 DIAGNOSIS — E11.65 TYPE 2 DIABETES MELLITUS WITH HYPERGLYCEMIA, WITH LONG-TERM CURRENT USE OF INSULIN (HCC): Primary | ICD-10-CM

## 2023-11-01 DIAGNOSIS — Z79.4 TYPE 2 DIABETES MELLITUS WITH HYPERGLYCEMIA, WITH LONG-TERM CURRENT USE OF INSULIN (HCC): Primary | ICD-10-CM

## 2023-11-01 DIAGNOSIS — E78.2 MIXED HYPERLIPIDEMIA: ICD-10-CM

## 2023-11-01 DIAGNOSIS — B35.1 ONYCHOMYCOSIS: ICD-10-CM

## 2023-11-01 PROCEDURE — 99214 OFFICE O/P EST MOD 30 MIN: CPT | Performed by: INTERNAL MEDICINE

## 2023-11-01 PROCEDURE — 3044F HG A1C LEVEL LT 7.0%: CPT | Performed by: INTERNAL MEDICINE

## 2023-11-01 RX ORDER — TERBINAFINE HYDROCHLORIDE 250 MG/1
250 TABLET ORAL DAILY
Qty: 30 TABLET | Refills: 4 | Status: SHIPPED | OUTPATIENT
Start: 2023-11-01

## 2023-11-01 SDOH — ECONOMIC STABILITY: FOOD INSECURITY: WITHIN THE PAST 12 MONTHS, THE FOOD YOU BOUGHT JUST DIDN'T LAST AND YOU DIDN'T HAVE MONEY TO GET MORE.: NEVER TRUE

## 2023-11-01 SDOH — ECONOMIC STABILITY: INCOME INSECURITY: HOW HARD IS IT FOR YOU TO PAY FOR THE VERY BASICS LIKE FOOD, HOUSING, MEDICAL CARE, AND HEATING?: SOMEWHAT HARD

## 2023-11-01 SDOH — ECONOMIC STABILITY: FOOD INSECURITY: WITHIN THE PAST 12 MONTHS, YOU WORRIED THAT YOUR FOOD WOULD RUN OUT BEFORE YOU GOT MONEY TO BUY MORE.: NEVER TRUE

## 2023-11-01 ASSESSMENT — PATIENT HEALTH QUESTIONNAIRE - PHQ9
SUM OF ALL RESPONSES TO PHQ9 QUESTIONS 1 & 2: 0
1. LITTLE INTEREST OR PLEASURE IN DOING THINGS: 0
SUM OF ALL RESPONSES TO PHQ QUESTIONS 1-9: 0
SUM OF ALL RESPONSES TO PHQ QUESTIONS 1-9: 0
2. FEELING DOWN, DEPRESSED OR HOPELESS: 0
SUM OF ALL RESPONSES TO PHQ QUESTIONS 1-9: 0
SUM OF ALL RESPONSES TO PHQ QUESTIONS 1-9: 0

## 2023-11-01 NOTE — PATIENT INSTRUCTIONS
1) [place] A1c esta 6.9%, poquito mas alto que 6.3% en [place]. Tenga cuidado con newby carbohidrato en la noche. 2) [place] cholesterol esta alto natan esta lo mismo que en Febrero del ano pasado. 3) [place] presion esta controlado y newby proteina en newby urino esta mejor. 4) Aichae Claudene Hack sobre rogelio simptomas de dormiendo en rogelio valarie a newby ciro el [address] [place].

## 2023-11-01 NOTE — PROGRESS NOTES
Coordination of Care  1. Have you been to the ER, urgent care clinic since your last visit? Hospitalized since your last visit? no    2. Have you seen or consulted any other health care providers outside of the [address] [place] [address] since your last visit? Include any pap smears or colon screening. no    Does the patient need refills? no      Learning Assessment Complete?  yes  Depression Screening complete in the past 12 months? yes

## 2023-11-01 NOTE — PROGRESS NOTES
Patient name and date of birth verified. Patient given an after visit summary, reviewed next scheduled appointments. Advised she will get a call of labs only appointment to be scheduled prior to her next appointment with Dr. Madison Hernandez. Patient verbalized understanding of all information given.   Jefry Lynn LPN

## 2023-11-14 ENCOUNTER — OFFICE VISIT (OUTPATIENT)
Age: 33
End: 2023-11-14

## 2023-11-14 DIAGNOSIS — Z71.89 COUNSELING AND COORDINATION OF CARE: Primary | ICD-10-CM

## 2023-11-15 ENCOUNTER — OFFICE VISIT (OUTPATIENT)
Age: 33
End: 2023-11-15

## 2023-11-15 VITALS
HEART RATE: 76 BPM | WEIGHT: 262 LBS | OXYGEN SATURATION: 95 % | SYSTOLIC BLOOD PRESSURE: 139 MMHG | DIASTOLIC BLOOD PRESSURE: 83 MMHG | BODY MASS INDEX: 41.12 KG/M2 | TEMPERATURE: 98 F

## 2023-11-15 DIAGNOSIS — E28.2 PCOS (POLYCYSTIC OVARIAN SYNDROME): Primary | ICD-10-CM

## 2023-11-15 DIAGNOSIS — G56.03 BILATERAL CARPAL TUNNEL SYNDROME: ICD-10-CM

## 2023-11-15 PROCEDURE — 99213 OFFICE O/P EST LOW 20 MIN: CPT | Performed by: PHYSICIAN ASSISTANT

## 2023-11-15 SDOH — ECONOMIC STABILITY: HOUSING INSECURITY: IN THE LAST 12 MONTHS, HOW MANY PLACES HAVE YOU LIVED?: 1

## 2023-11-15 SDOH — SOCIAL STABILITY: SOCIAL INSECURITY: WITHIN THE LAST YEAR, HAVE YOU BEEN AFRAID OF YOUR PARTNER OR EX-PARTNER?: NO

## 2023-11-15 SDOH — HEALTH STABILITY: MENTAL HEALTH: HOW MANY STANDARD DRINKS CONTAINING ALCOHOL DO YOU HAVE ON A TYPICAL DAY?: PATIENT DOES NOT DRINK

## 2023-11-15 SDOH — ECONOMIC STABILITY: TRANSPORTATION INSECURITY
IN THE PAST 12 MONTHS, HAS THE LACK OF TRANSPORTATION KEPT YOU FROM MEDICAL APPOINTMENTS OR FROM GETTING MEDICATIONS?: NO

## 2023-11-15 SDOH — SOCIAL STABILITY: SOCIAL INSECURITY
WITHIN THE LAST YEAR, HAVE TO BEEN RAPED OR FORCED TO HAVE ANY KIND OF SEXUAL ACTIVITY BY YOUR PARTNER OR EX-PARTNER?: NO

## 2023-11-15 SDOH — SOCIAL STABILITY: SOCIAL INSECURITY: WITHIN THE LAST YEAR, HAVE YOU BEEN HUMILIATED OR EMOTIONALLY ABUSED IN OTHER WAYS BY YOUR PARTNER OR EX-PARTNER?: NO

## 2023-11-15 SDOH — SOCIAL STABILITY: SOCIAL INSECURITY
WITHIN THE LAST YEAR, HAVE YOU BEEN KICKED, HIT, SLAPPED, OR OTHERWISE PHYSICALLY HURT BY YOUR PARTNER OR EX-PARTNER?: NO

## 2023-11-15 SDOH — HEALTH STABILITY: MENTAL HEALTH: HOW OFTEN DO YOU HAVE A DRINK CONTAINING ALCOHOL?: NEVER

## 2023-11-15 SDOH — ECONOMIC STABILITY: INCOME INSECURITY: IN THE LAST 12 MONTHS, WAS THERE A TIME WHEN YOU WERE NOT ABLE TO PAY THE MORTGAGE OR RENT ON TIME?: NO

## 2023-11-15 SDOH — ECONOMIC STABILITY: INCOME INSECURITY: HOW HARD IS IT FOR YOU TO PAY FOR THE VERY BASICS LIKE FOOD, HOUSING, MEDICAL CARE, AND HEATING?: NOT HARD AT ALL

## 2023-11-15 ASSESSMENT — PATIENT HEALTH QUESTIONNAIRE - PHQ9
SUM OF ALL RESPONSES TO PHQ QUESTIONS 1-9: 0
2. FEELING DOWN, DEPRESSED OR HOPELESS: 0
SUM OF ALL RESPONSES TO PHQ QUESTIONS 1-9: 0
SUM OF ALL RESPONSES TO PHQ9 QUESTIONS 1 & 2: 0
SUM OF ALL RESPONSES TO PHQ QUESTIONS 1-9: 0
SUM OF ALL RESPONSES TO PHQ QUESTIONS 1-9: 0
1. LITTLE INTEREST OR PLEASURE IN DOING THINGS: 0

## 2023-11-15 NOTE — PROGRESS NOTES
Assessment/Plan:    Minh Tirado was seen today for other. Diagnoses and all orders for this visit:    PCOS (polycystic ovarian syndrome)  On appropriate meds, a1c well controlled, lifestyle changes discussed and encouraged  Her f/up pelvic ultrasound showed partial resolution of her maribeth cysts    Bilateral carpal tunnel syndrome  Improved with braces and elavil      No follow-up provider specified. CHARLES Shoemaker Him expressed understanding of this plan. An AVS was printed and given to the patient.      ----------------------------------------------------------------------    Chief Complaint   Patient presents with    Other     Ultra sound results       History of Present Illness:  Pt presents for discussion of her second pelvic ultrasound done about 4 weeks after the first one. The first one showed a 9 cm ovarian cyst in one ovary and a 5 cm ovarian cyst in the other one. She had resolution to 3by 2 cm in the 9 cm cyst and 2 by 2 in the 5 cm cyst  Her wrist pain is improved with the current therapy    She has been working on diet changes and eating more vegetables  She and her  are exercising more       Past Medical History:   Diagnosis Date    Obesity     Type 2 diabetes mellitus ([address]) 10/2021       Current Outpatient Medications   Medication Sig Dispense Refill    amitriptyline (ELAVIL) 10 MG tablet Take 1 tablet by mouth nightly 90 tablet 1    terbinafine (LAMISIL) 250 MG tablet Take 1 tablet by mouth daily 30 tablet 4    Prenatal Vit-Fe Fumarate-FA (PRENATAL PLUS) 27-1 MG TABS Si po daily 90 tablet 3    insulin NPH (HUMULIN N;NOVOLIN N) 100 UNIT/ML injection vial Inject 60 units at bedtime--dispense relion brand      metFORMIN (GLUCOPHAGE-XR) 500 MG extended release tablet 2 po bid ac breakfast and dinner for diabetes. Valentine 2 tab antes de desayuno y 2 tab antes de la kamla para diabetes. No current facility-administered medications for this visit.        No

## 2024-02-29 ENCOUNTER — TELEPHONE (OUTPATIENT)
Age: 34
End: 2024-02-29

## 2024-02-29 NOTE — TELEPHONE ENCOUNTER
Chief Complaint   Patient presents with    Labs Only     Appointment needed for labs only Fasting.  Patient is scheduled for 03/06/2024 with Dr. Romo.  Labs to complete are Hemoglobin A1C, Lipid, CMP and Microalbumin Creatinine      Please schedule labs only fasting appointment.  Maria G Manriquez LPN

## 2024-03-06 ENCOUNTER — OFFICE VISIT (OUTPATIENT)
Age: 34
End: 2024-03-06

## 2024-03-06 ENCOUNTER — HOSPITAL ENCOUNTER (OUTPATIENT)
Facility: HOSPITAL | Age: 34
Setting detail: SPECIMEN
Discharge: HOME | End: 2024-03-09

## 2024-03-06 VITALS
HEART RATE: 99 BPM | OXYGEN SATURATION: 99 % | BODY MASS INDEX: 42.06 KG/M2 | DIASTOLIC BLOOD PRESSURE: 78 MMHG | SYSTOLIC BLOOD PRESSURE: 125 MMHG | WEIGHT: 268 LBS | TEMPERATURE: 97.6 F

## 2024-03-06 DIAGNOSIS — E78.2 MIXED HYPERLIPIDEMIA: ICD-10-CM

## 2024-03-06 DIAGNOSIS — E28.2 PCOS (POLYCYSTIC OVARIAN SYNDROME): ICD-10-CM

## 2024-03-06 DIAGNOSIS — Z79.4 TYPE 2 DIABETES MELLITUS WITHOUT COMPLICATION, WITH LONG-TERM CURRENT USE OF INSULIN (HCC): Primary | ICD-10-CM

## 2024-03-06 DIAGNOSIS — E11.9 TYPE 2 DIABETES MELLITUS WITHOUT COMPLICATION, WITH LONG-TERM CURRENT USE OF INSULIN (HCC): ICD-10-CM

## 2024-03-06 DIAGNOSIS — Z79.4 TYPE 2 DIABETES MELLITUS WITHOUT COMPLICATION, WITH LONG-TERM CURRENT USE OF INSULIN (HCC): ICD-10-CM

## 2024-03-06 DIAGNOSIS — E11.9 TYPE 2 DIABETES MELLITUS WITHOUT COMPLICATION, WITH LONG-TERM CURRENT USE OF INSULIN (HCC): Primary | ICD-10-CM

## 2024-03-06 PROCEDURE — 99214 OFFICE O/P EST MOD 30 MIN: CPT | Performed by: INTERNAL MEDICINE

## 2024-03-06 PROCEDURE — 36415 COLL VENOUS BLD VENIPUNCTURE: CPT

## 2024-03-06 PROCEDURE — 83036 HEMOGLOBIN GLYCOSYLATED A1C: CPT

## 2024-03-06 PROCEDURE — 82043 UR ALBUMIN QUANTITATIVE: CPT

## 2024-03-06 PROCEDURE — 82570 ASSAY OF URINE CREATININE: CPT

## 2024-03-06 PROCEDURE — 80053 COMPREHEN METABOLIC PANEL: CPT

## 2024-03-06 PROCEDURE — 80061 LIPID PANEL: CPT

## 2024-03-06 ASSESSMENT — PATIENT HEALTH QUESTIONNAIRE - PHQ9
SUM OF ALL RESPONSES TO PHQ QUESTIONS 1-9: 0
SUM OF ALL RESPONSES TO PHQ QUESTIONS 1-9: 0
SUM OF ALL RESPONSES TO PHQ9 QUESTIONS 1 & 2: 0
SUM OF ALL RESPONSES TO PHQ QUESTIONS 1-9: 0
1. LITTLE INTEREST OR PLEASURE IN DOING THINGS: 0
2. FEELING DOWN, DEPRESSED OR HOPELESS: 0
SUM OF ALL RESPONSES TO PHQ QUESTIONS 1-9: 0

## 2024-03-06 NOTE — PROGRESS NOTES
Chief Complaint   Patient presents with    Diabetes     Follow up     Patient name and date of birth verified.  Patient given an after visit summary and reviewed next scheduled appointment.  Patient will be scheduled for labs prior to next scheduled visit.  GABE Garcia RN noted that patient could apply for her insulin through the pharmacy assistance program.  Patient mailed an application for insulin.  Maria G Manriquez LPN    
Coordination of Care  1. Have you been to the ER, urgent care clinic since your last visit?  Hospitalized since your last visit? no    2. Have you seen or consulted any other health care providers outside of the [facility] since your last visit?  Include any pap smears or colon screening. no    Does the patient need refills? yes    Learning Assessment Complete? yes  Depression Screening complete in the past 12 months? yes    
with long-term current use of insulin (HCC): her most recent Hgb A1c was 6.9% in 10/23 up from 6.3% in 6/23 down from 7% in 2/23 down from 8.2% in 10/22 up from 6.8% in 6/22 down from 7.3% in 2/22 down (1st visit with me) down from 11.1% in 10/21 and was just diagnosed with DM on 9/28/21.  She desires pregnancy and I discussed that insulin and metformin is the safest regimen to use at this time.  Her A1c was just above goal < 6.5% at last check.  - cont NPH 60 units at bedtime  - cont metformin  mg 2 tabs bid  - check bs fasting and 2 hours after one of her meals per day  - microalbumin 74 in 10/21 down to 70 in 2/22 and 36 in 6/22 and 61 in 6/23 and 42 in 10/23--may need ACE in the future but will hold while considering pregnancy  - foot exam done 2/22  - check Hgb A1 and cmp and microalbumin today         2. Hyperlipidemia LDL goal <100:  in 10/21 when diagnosed with diabetes and 151 in 6/22 and 154 in 2/23 and 156 in 10/23.  Will hold on any statins while trying to conceive  - check lipids today          3. Onychomycosis: still has some fungus so will refill her lamisil.  Her AST was slightly elevated but this may be from her weight and higher sugar  - cont lamisil 250 mg daily      Patient Instructions   1) Solamente eliza amitriptyline si hay dolor porque esta pastilla puede subir peso.    2) Voy a maylin un mesaje por mychart con los resultados.        Return 8/7/24 at 3:40pm.    Lab follow up: 03/07/24  Component      Latest Ref Rng 3/6/2024   Sodium      136 - 145 mmol/L 135 (L)    Potassium      3.5 - 5.1 mmol/L 3.8    Chloride      97 - 108 mmol/L 103    CO2      21 - 32 mmol/L 25    Anion Gap      5 - 15 mmol/L 7    Glucose, Random      65 - 100 mg/dL 79    BUN,BUNPL      6 - 20 MG/DL 10    Creatinine      0.55 - 1.02 MG/DL 0.58    Bun/Cre Ratio      12 - 20   17    Est, Glom Filt Rate      >60 ml/min/1.73m2 >60    CALCIUM, SERUM, 468248      8.5 - 10.1 MG/DL 9.7    BILIRUBIN TOTAL      0.2 -

## 2024-03-06 NOTE — PATIENT INSTRUCTIONS
1) Solamente eliza amitriptyline si hay dolor porque esta pastilla puede subir peso.    2) Voy a maylin un mesaje por mychart con los resultados.

## 2024-03-07 LAB
ALBUMIN SERPL-MCNC: 3.4 G/DL (ref 3.5–5)
ALBUMIN/GLOB SERPL: 0.9 (ref 1.1–2.2)
ALP SERPL-CCNC: 130 U/L (ref 45–117)
ALT SERPL-CCNC: 33 U/L (ref 12–78)
ANION GAP SERPL CALC-SCNC: 7 MMOL/L (ref 5–15)
AST SERPL-CCNC: 31 U/L (ref 15–37)
BILIRUB SERPL-MCNC: 0.3 MG/DL (ref 0.2–1)
BUN SERPL-MCNC: 10 MG/DL (ref 6–20)
BUN/CREAT SERPL: 17 (ref 12–20)
CALCIUM SERPL-MCNC: 9.7 MG/DL (ref 8.5–10.1)
CHLORIDE SERPL-SCNC: 103 MMOL/L (ref 97–108)
CHOLEST SERPL-MCNC: 254 MG/DL
CO2 SERPL-SCNC: 25 MMOL/L (ref 21–32)
CREAT SERPL-MCNC: 0.58 MG/DL (ref 0.55–1.02)
CREAT UR-MCNC: 149 MG/DL
EST. AVERAGE GLUCOSE BLD GHB EST-MCNC: 189 MG/DL
GLOBULIN SER CALC-MCNC: 3.9 G/DL (ref 2–4)
GLUCOSE SERPL-MCNC: 79 MG/DL (ref 65–100)
HBA1C MFR BLD: 8.2 % (ref 4–5.6)
HDLC SERPL-MCNC: 77 MG/DL
HDLC SERPL: 3.3 (ref 0–5)
LDLC SERPL CALC-MCNC: 146.6 MG/DL (ref 0–100)
POTASSIUM SERPL-SCNC: 3.8 MMOL/L (ref 3.5–5.1)
PROT SERPL-MCNC: 7.3 G/DL (ref 6.4–8.2)
SODIUM SERPL-SCNC: 135 MMOL/L (ref 136–145)
TRIGL SERPL-MCNC: 152 MG/DL
VLDLC SERPL CALC-MCNC: 30.4 MG/DL

## 2024-07-03 ENCOUNTER — TELEPHONE (OUTPATIENT)
Age: 34
End: 2024-07-03

## 2024-07-03 NOTE — TELEPHONE ENCOUNTER
Lab appt scheduled for 07/29 at 9:30am before her appt with Dr. Romo on 08/07.     [facility]  [id number] assisted with interpretation.

## 2024-07-29 ENCOUNTER — LAB (OUTPATIENT)
Age: 34
End: 2024-07-29

## 2024-07-29 ENCOUNTER — HOSPITAL ENCOUNTER (OUTPATIENT)
Facility: HOSPITAL | Age: 34
Setting detail: SPECIMEN
Discharge: HOME | End: 2024-08-01

## 2024-07-29 DIAGNOSIS — E28.2 PCOS (POLYCYSTIC OVARIAN SYNDROME): ICD-10-CM

## 2024-07-29 DIAGNOSIS — E11.9 TYPE 2 DIABETES MELLITUS WITHOUT COMPLICATION, WITH LONG-TERM CURRENT USE OF INSULIN (HCC): ICD-10-CM

## 2024-07-29 DIAGNOSIS — E78.2 MIXED HYPERLIPIDEMIA: ICD-10-CM

## 2024-07-29 DIAGNOSIS — Z79.4 TYPE 2 DIABETES MELLITUS WITHOUT COMPLICATION, WITH LONG-TERM CURRENT USE OF INSULIN (HCC): ICD-10-CM

## 2024-07-29 LAB
ALBUMIN SERPL-MCNC: 3.6 G/DL (ref 3.5–5)
ALBUMIN/GLOB SERPL: 1 (ref 1.1–2.2)
ALP SERPL-CCNC: 138 U/L (ref 45–117)
ALT SERPL-CCNC: 26 U/L (ref 12–78)
ANION GAP SERPL CALC-SCNC: 7 MMOL/L (ref 5–15)
AST SERPL-CCNC: 16 U/L (ref 15–37)
BILIRUB SERPL-MCNC: 0.3 MG/DL (ref 0.2–1)
BUN SERPL-MCNC: 11 MG/DL (ref 6–20)
BUN/CREAT SERPL: 17 (ref 12–20)
CALCIUM SERPL-MCNC: 9 MG/DL (ref 8.5–10.1)
CHLORIDE SERPL-SCNC: 104 MMOL/L (ref 97–108)
CHOLEST SERPL-MCNC: 227 MG/DL
CO2 SERPL-SCNC: 27 MMOL/L (ref 21–32)
CREAT SERPL-MCNC: 0.65 MG/DL (ref 0.55–1.02)
GLOBULIN SER CALC-MCNC: 3.6 G/DL (ref 2–4)
GLUCOSE SERPL-MCNC: 118 MG/DL (ref 65–100)
HDLC SERPL-MCNC: 61 MG/DL
HDLC SERPL: 3.7 (ref 0–5)
LDLC SERPL CALC-MCNC: 146.8 MG/DL (ref 0–100)
POTASSIUM SERPL-SCNC: 3.9 MMOL/L (ref 3.5–5.1)
PROT SERPL-MCNC: 7.2 G/DL (ref 6.4–8.2)
SODIUM SERPL-SCNC: 138 MMOL/L (ref 136–145)
TRIGL SERPL-MCNC: 96 MG/DL
VLDLC SERPL CALC-MCNC: 19.2 MG/DL

## 2024-07-29 PROCEDURE — 82570 ASSAY OF URINE CREATININE: CPT

## 2024-07-29 PROCEDURE — 36415 COLL VENOUS BLD VENIPUNCTURE: CPT

## 2024-07-29 PROCEDURE — 83036 HEMOGLOBIN GLYCOSYLATED A1C: CPT

## 2024-07-29 PROCEDURE — 80053 COMPREHEN METABOLIC PANEL: CPT

## 2024-07-29 PROCEDURE — 80061 LIPID PANEL: CPT

## 2024-07-29 PROCEDURE — 82043 UR ALBUMIN QUANTITATIVE: CPT

## 2024-07-30 LAB
CREAT UR-MCNC: 110 MG/DL
EST. AVERAGE GLUCOSE BLD GHB EST-MCNC: 154 MG/DL
HBA1C MFR BLD: 7 % (ref 4–5.6)
MICROALBUMIN UR-MCNC: 12.8 MG/DL
MICROALBUMIN/CREAT UR-RTO: 116 MG/G (ref 0–30)

## 2024-08-07 ENCOUNTER — TELEMEDICINE (OUTPATIENT)
Age: 34
End: 2024-08-07

## 2024-08-07 DIAGNOSIS — E28.2 PCOS (POLYCYSTIC OVARIAN SYNDROME): ICD-10-CM

## 2024-08-07 DIAGNOSIS — E11.9 TYPE 2 DIABETES MELLITUS WITHOUT COMPLICATION, WITH LONG-TERM CURRENT USE OF INSULIN (HCC): Primary | ICD-10-CM

## 2024-08-07 DIAGNOSIS — E78.2 MIXED HYPERLIPIDEMIA: ICD-10-CM

## 2024-08-07 DIAGNOSIS — Z79.4 TYPE 2 DIABETES MELLITUS WITHOUT COMPLICATION, WITH LONG-TERM CURRENT USE OF INSULIN (HCC): Primary | ICD-10-CM

## 2024-08-07 PROCEDURE — 99214 OFFICE O/P EST MOD 30 MIN: CPT | Performed by: INTERNAL MEDICINE

## 2024-08-07 PROCEDURE — 3051F HG A1C>EQUAL 7.0%<8.0%: CPT | Performed by: INTERNAL MEDICINE

## 2024-08-07 ASSESSMENT — PATIENT HEALTH QUESTIONNAIRE - PHQ9
SUM OF ALL RESPONSES TO PHQ QUESTIONS 1-9: 0
SUM OF ALL RESPONSES TO PHQ9 QUESTIONS 1 & 2: 0
2. FEELING DOWN, DEPRESSED OR HOPELESS: NOT AT ALL
1. LITTLE INTEREST OR PLEASURE IN DOING THINGS: NOT AT ALL
SUM OF ALL RESPONSES TO PHQ QUESTIONS 1-9: 0

## 2024-08-07 NOTE — PROGRESS NOTES
\"Have you been to the ER, urgent care clinic since your last visit?  Hospitalized since your last visit?\"    NO    “Have you seen or consulted any other health care providers outside of [facility] since your last visit?”    NO            Click Here for Release of Records Request

## 2024-08-07 NOTE — PROGRESS NOTES
Chief Complaint   Patient presents with    Follow-up     Diabetes f/u. Per patient sugar was 130 in the morning pt non fasting. # to reach pt [phone]       **THIS IS A VIRTUAL VISIT VIA A VIDEO SYNCHRONOUS DISCUSSION. PATIENT AGREED TO HAVE THEIR CARE DELIVERED OVER A [employer] VIDEO VISIT IN PLACE OF THEIR REGULARLY SCHEDULED OFFICE VISIT**    History of Present Illness: Ritika Carrasco is a 34 y.o. female here for follow up of diabetes.  Has been taking NPH 65 units at bedtime and fasting sugars are usually in the 100-130 range.  Has checked some bedtime sugars and they are mostly in the 160-180 range and hasn't seen any over 200.  She is still considering pregnancy.  Has been off the elavil and carpal tunnel symptoms are better. Her nails have been better on the lamisil.      Current Outpatient Medications   Medication Sig    meloxicam (MOBIC) 15 MG tablet Take 1 tablet by mouth daily    metFORMIN (GLUCOPHAGE-XR) 500 MG extended release tablet 2 po bid ac breakfast and dinner for diabetes.  Valentine 2 tab antes de desayuno y 2 tab antes de la kamla para diabetes.    terbinafine (LAMISIL) 250 MG tablet Take 1 tablet by mouth daily    insulin NPH (HUMULIN N;NOVOLIN N) 100 UNIT/ML injection vial Inject 65 units at bedtime--Dose change 24--updated med list--did not send prescription to the pharmacy    Prenatal Vit-Fe Fumarate-FA (PRENATAL PLUS) 27-1 MG TABS Si po daily (Patient not taking: Reported on 2023)     No current facility-administered medications for this visit.     No Known Allergies    Review of Systems: PER HPI    Physical Examination:  - GENERAL: NCAT, Appears well nourished   - EYES: EOMI, non-icteric, no proptosis   - Ear/Nose/Throat: NCAT, no visible inflammation or masses   - CARDIOVASCULAR: no cyanosis, no visible JVD   - RESPIRATORY: respiratory effort normal without any distress or labored breathing   - MUSCULOSKELETAL: Normal ROM of neck and upper extremities observed   -

## 2024-09-11 DIAGNOSIS — E11.9 TYPE 2 DIABETES MELLITUS WITHOUT COMPLICATION, WITH LONG-TERM CURRENT USE OF INSULIN (HCC): Primary | ICD-10-CM

## 2024-09-11 DIAGNOSIS — Z79.4 TYPE 2 DIABETES MELLITUS WITHOUT COMPLICATION, WITH LONG-TERM CURRENT USE OF INSULIN (HCC): Primary | ICD-10-CM

## 2024-09-16 ENCOUNTER — CLINICAL DOCUMENTATION (OUTPATIENT)
Age: 34
End: 2024-09-16

## 2024-10-09 ENCOUNTER — HOSPITAL ENCOUNTER (EMERGENCY)
Facility: HOSPITAL | Age: 34
Discharge: HOME | End: 2024-10-09
Attending: EMERGENCY MEDICINE

## 2024-10-09 VITALS
OXYGEN SATURATION: 98 % | HEIGHT: 69 IN | RESPIRATION RATE: 20 BRPM | WEIGHT: 264.55 LBS | HEART RATE: 97 BPM | SYSTOLIC BLOOD PRESSURE: 155 MMHG | TEMPERATURE: 97.6 F | DIASTOLIC BLOOD PRESSURE: 99 MMHG | BODY MASS INDEX: 39.18 KG/M2

## 2024-10-09 DIAGNOSIS — R04.0 EPISTAXIS: Primary | ICD-10-CM

## 2024-10-09 LAB
BASOPHILS # BLD: 0.1 K/UL (ref 0–0.1)
BASOPHILS NFR BLD: 1 % (ref 0–1)
COMMENT:: NORMAL
DIFFERENTIAL METHOD BLD: ABNORMAL
EOSINOPHIL # BLD: 0.4 K/UL (ref 0–0.4)
EOSINOPHIL NFR BLD: 3 % (ref 0–7)
ERYTHROCYTE [DISTWIDTH] IN BLOOD BY AUTOMATED COUNT: 15.9 % (ref 11.5–14.5)
HCT VFR BLD AUTO: 30.3 % (ref 35–47)
HGB BLD-MCNC: 9.2 G/DL (ref 11.5–16)
IMM GRANULOCYTES # BLD AUTO: 0.1 K/UL (ref 0–0.04)
IMM GRANULOCYTES NFR BLD AUTO: 1 % (ref 0–0.5)
LYMPHOCYTES # BLD: 4.1 K/UL (ref 0.8–3.5)
LYMPHOCYTES NFR BLD: 39 % (ref 12–49)
MCH RBC QN AUTO: 22.5 PG (ref 26–34)
MCHC RBC AUTO-ENTMCNC: 30.4 G/DL (ref 30–36.5)
MCV RBC AUTO: 74.1 FL (ref 80–99)
MONOCYTES # BLD: 0.6 K/UL (ref 0–1)
MONOCYTES NFR BLD: 5 % (ref 5–13)
NEUTS SEG # BLD: 5.3 K/UL (ref 1.8–8)
NEUTS SEG NFR BLD: 51 % (ref 32–75)
NRBC # BLD: 0 K/UL (ref 0–0.01)
NRBC BLD-RTO: 0 PER 100 WBC
PLATELET # BLD AUTO: 380 K/UL (ref 150–400)
PMV BLD AUTO: 10 FL (ref 8.9–12.9)
RBC # BLD AUTO: 4.09 M/UL (ref 3.8–5.2)
SPECIMEN HOLD: NORMAL
WBC # BLD AUTO: 10.5 K/UL (ref 3.6–11)

## 2024-10-09 PROCEDURE — 85025 COMPLETE CBC W/AUTO DIFF WBC: CPT

## 2024-10-09 PROCEDURE — 6370000000 HC RX 637 (ALT 250 FOR IP)

## 2024-10-09 PROCEDURE — 99283 EMERGENCY DEPT VISIT LOW MDM: CPT

## 2024-10-09 PROCEDURE — 36415 COLL VENOUS BLD VENIPUNCTURE: CPT

## 2024-10-09 RX ORDER — FLUTICASONE PROPIONATE 50 MCG
2 SPRAY, SUSPENSION (ML) NASAL DAILY
Qty: 16 G | Refills: 0 | Status: SHIPPED | OUTPATIENT
Start: 2024-10-09

## 2024-10-09 RX ORDER — OXYMETAZOLINE HYDROCHLORIDE 0.05 G/100ML
2 SPRAY NASAL
Status: COMPLETED | OUTPATIENT
Start: 2024-10-09 | End: 2024-10-09

## 2024-10-09 RX ADMIN — OXYMETAZOLINE HCL 2 SPRAY: 0.05 SPRAY NASAL at 20:29

## 2024-10-09 ASSESSMENT — PAIN SCALES - GENERAL: PAINLEVEL_OUTOF10: 0

## 2024-10-09 ASSESSMENT — PAIN - FUNCTIONAL ASSESSMENT: PAIN_FUNCTIONAL_ASSESSMENT: 0-10

## 2024-10-09 NOTE — ED PROVIDER NOTES
[facility] EMERGENCY DEP  EMERGENCY DEPARTMENT ENCOUNTER      Pt Name: Ritika Carrasco  MRN: [id number]  Birthdate 1990  Date of evaluation: 10/9/2024  Provider: Shaheen Ortega PA-C    CHIEF COMPLAINT       Chief Complaint   Patient presents with    Epistaxis         HISTORY OF PRESENT ILLNESS    Patient is an 34 y.o. female with history of obesity and diabetes who presents to the ER with reports of multiple nose bleeds per day over the past 3 weeks. Episodes usually last around 30 minutes. Patient reports it will usually only bleed from one nostril at a time. Patient reports the blood keeps going back the back of her throat. Patient reports no additional symptoms. Patient denies chest pain, shortness of breath, abdominal pain, urinary symptoms, nausea or vomiting, diarrhea or constipation, headache, dizziness, lightheadedness, fever or chills. Patient reports no alcohol use, former cigarette smoking, and denies any drug use.  Patient also reports she has been using alcohol around herto try and stop the bleeding.          Nursing Notes were reviewed.    REVIEW OF SYSTEMS       Review of Systems      PAST MEDICAL HISTORY     Past Medical History:   Diagnosis Date    Obesity     Type 2 diabetes mellitus (HCC) 10/2021         SURGICAL HISTORY       Past Surgical History:   Procedure Laterality Date    HEENT      benign tumor removed from left side of her neck at age 6 months         CURRENT MEDICATIONS       Previous Medications    INSULIN NPH (HUMULIN N;NOVOLIN N) 100 UNIT/ML INJECTION VIAL    Inject 65 Units into the skin nightly Max dose 80U daily. Via PAP    MELOXICAM (MOBIC) 15 MG TABLET    Take 1 tablet by mouth daily    METFORMIN (GLUCOPHAGE-XR) 500 MG EXTENDED RELEASE TABLET    2 po bid ac breakfast and dinner for diabetes.  Valentine 2 tab antes de desayuno y 2 tab antes de la kamla para diabetes.    PRENATAL VIT-FE FUMARATE-FA (PRENATAL PLUS) 27-1 MG TABS    Si po daily    TERBINAFINE (LAMISIL) 250 MG

## 2024-10-10 ENCOUNTER — TELEPHONE (OUTPATIENT)
Age: 34
End: 2024-10-10

## 2024-10-10 NOTE — ED TRIAGE NOTES
Pt ambulatory to triage w/ son w/ c/o nose bleed x 30\" PTA.    Pe son pt has hx of nose bleeds.  Pt holding 4x4's over nose for uncontrolled bleeding.  Nasal clamp applied in triage

## 2024-10-10 NOTE — TELEPHONE ENCOUNTER
T/C made to the patient with assistance from [facility]  #[id number], session code #66726, to follow-up after her 10-09-24 [facility] ED visit for c/o: Nosebleed x 30 minutes  Dx: Epistaxis    Labs: abnormal results in the CBC  (Pattern of abnormal CBC results noted since 09- draw.)    Rx: Fluticasone    Follow-up with PCP recommended by the ED.    Per the patient, she is feeling a little better today and has not had a nosebleed since leaving the ED yesterday. Patient stated that the nosebleeds began about 3 weeks ago and have been gradually increasing in frequency and intensity. She denies recent illness or injury and reports that the nosebleeds are sometimes accompanied by clots and then a headache. Per the patient, she has not had any change recently to her hearing or vision abilities and also denies any change to her sense of smell or taste. Per the patient, she has not identified a pattern or triggering event for the nosebleeds and stated that they often start when she is at home. She stated that she has not needed to turn on the heat in her home recently and also has not tried using a humidifier to help with the nosebleeds.    The patient stated that she has an Afrin nasal spray from the ED and will pick-up the prescribed Flonase later today. She also stated that her current daily medications are Metformin and Humulin N, which she is taking as prescribed. Patient stated that she has a glucometer and uses it in the morning about 3 times per week to check a fasting blood sugar. She has not kept a log of the readings and agreed to start. She understands to bring the log with her to her medical appointments and stated that her blood sugar is usually in the 130s. Per the patient, her LMP began 09-20-24 and was consistent with her usual cycles.    The patient is not enrolled in the [facility] [employer] referral program.    She agreed to an ED follow-up appointment on 10-14-24, 11:10am with Meghan Madden PA-C, at our

## 2024-10-10 NOTE — DISCHARGE INSTRUCTIONS
Discussed visit today. Please use the afrin at home if you get another nose bleed. We will start another nasal spray daily.     Return to the ER with any worsening of symptoms.     Visita discutida hoy. Utilice Afrin en casa si le vuelve a sangrar la nariz. Comenzaremos otro aerosol nasal diariamente.     Regrese a la evonne de emergencias si los síntomas empeoran.

## 2024-10-14 ENCOUNTER — HOSPITAL ENCOUNTER (OUTPATIENT)
Facility: HOSPITAL | Age: 34
Setting detail: SPECIMEN
Discharge: HOME | End: 2024-10-17

## 2024-10-14 ENCOUNTER — OFFICE VISIT (OUTPATIENT)
Age: 34
End: 2024-10-14

## 2024-10-14 VITALS
BODY MASS INDEX: 38.99 KG/M2 | DIASTOLIC BLOOD PRESSURE: 75 MMHG | SYSTOLIC BLOOD PRESSURE: 130 MMHG | OXYGEN SATURATION: 97 % | HEART RATE: 89 BPM | WEIGHT: 264 LBS | TEMPERATURE: 98.6 F

## 2024-10-14 DIAGNOSIS — D50.9 IRON DEFICIENCY ANEMIA, UNSPECIFIED IRON DEFICIENCY ANEMIA TYPE: ICD-10-CM

## 2024-10-14 DIAGNOSIS — Z23 ENCOUNTER FOR ADMINISTRATION OF VACCINE: Primary | ICD-10-CM

## 2024-10-14 DIAGNOSIS — Z79.4 TYPE 2 DIABETES MELLITUS WITHOUT COMPLICATION, WITH LONG-TERM CURRENT USE OF INSULIN (HCC): ICD-10-CM

## 2024-10-14 DIAGNOSIS — R04.0 EPISTAXIS: ICD-10-CM

## 2024-10-14 DIAGNOSIS — E11.9 TYPE 2 DIABETES MELLITUS WITHOUT COMPLICATION, WITH LONG-TERM CURRENT USE OF INSULIN (HCC): ICD-10-CM

## 2024-10-14 DIAGNOSIS — Z09 HOSPITAL DISCHARGE FOLLOW-UP: ICD-10-CM

## 2024-10-14 LAB
BASOPHILS # BLD: 0.1 K/UL (ref 0–0.1)
BASOPHILS NFR BLD: 1 % (ref 0–1)
DIFFERENTIAL METHOD BLD: ABNORMAL
EOSINOPHIL # BLD: 0.3 K/UL (ref 0–0.4)
EOSINOPHIL NFR BLD: 3 % (ref 0–7)
ERYTHROCYTE [DISTWIDTH] IN BLOOD BY AUTOMATED COUNT: 16 % (ref 11.5–14.5)
GLUCOSE, POC: 185 MG/DL
HCT VFR BLD AUTO: 27.9 % (ref 35–47)
HEMOGLOBIN, POC: 7.2 G/DL
HGB BLD-MCNC: 8.4 G/DL (ref 11.5–16)
IMM GRANULOCYTES # BLD AUTO: 0.1 K/UL (ref 0–0.04)
IMM GRANULOCYTES NFR BLD AUTO: 1 % (ref 0–0.5)
IRON SATN MFR SERPL: 2 % (ref 20–50)
IRON SERPL-MCNC: 11 UG/DL (ref 35–150)
LYMPHOCYTES # BLD: 3.1 K/UL (ref 0.8–3.5)
LYMPHOCYTES NFR BLD: 32 % (ref 12–49)
MCH RBC QN AUTO: 22.2 PG (ref 26–34)
MCHC RBC AUTO-ENTMCNC: 30.1 G/DL (ref 30–36.5)
MCV RBC AUTO: 73.8 FL (ref 80–99)
MONOCYTES # BLD: 0.6 K/UL (ref 0–1)
MONOCYTES NFR BLD: 7 % (ref 5–13)
NEUTS SEG # BLD: 5.7 K/UL (ref 1.8–8)
NEUTS SEG NFR BLD: 56 % (ref 32–75)
NRBC # BLD: 0 K/UL (ref 0–0.01)
NRBC BLD-RTO: 0 PER 100 WBC
PLATELET # BLD AUTO: 361 K/UL (ref 150–400)
PMV BLD AUTO: 10.1 FL (ref 8.9–12.9)
RBC # BLD AUTO: 3.78 M/UL (ref 3.8–5.2)
TIBC SERPL-MCNC: 443 UG/DL (ref 250–450)
WBC # BLD AUTO: 9.9 K/UL (ref 3.6–11)

## 2024-10-14 PROCEDURE — 90471 IMMUNIZATION ADMIN: CPT | Performed by: PHYSICIAN ASSISTANT

## 2024-10-14 PROCEDURE — 85018 HEMOGLOBIN: CPT | Performed by: PHYSICIAN ASSISTANT

## 2024-10-14 PROCEDURE — 85025 COMPLETE CBC W/AUTO DIFF WBC: CPT

## 2024-10-14 PROCEDURE — 1111F DSCHRG MED/CURRENT MED MERGE: CPT | Performed by: PHYSICIAN ASSISTANT

## 2024-10-14 PROCEDURE — 3051F HG A1C>EQUAL 7.0%<8.0%: CPT | Performed by: PHYSICIAN ASSISTANT

## 2024-10-14 PROCEDURE — 82962 GLUCOSE BLOOD TEST: CPT | Performed by: PHYSICIAN ASSISTANT

## 2024-10-14 PROCEDURE — 99213 OFFICE O/P EST LOW 20 MIN: CPT | Performed by: PHYSICIAN ASSISTANT

## 2024-10-14 PROCEDURE — 90656 IIV3 VACC NO PRSV 0.5 ML IM: CPT | Performed by: PHYSICIAN ASSISTANT

## 2024-10-14 PROCEDURE — 83540 ASSAY OF IRON: CPT

## 2024-10-14 PROCEDURE — 83550 IRON BINDING TEST: CPT

## 2024-10-14 RX ORDER — FERROUS SULFATE 325(65) MG
325 TABLET ORAL 2 TIMES DAILY
Qty: 180 TABLET | Refills: 0 | Status: SHIPPED | OUTPATIENT
Start: 2024-10-14

## 2024-10-14 RX ORDER — LETROZOLE 2.5 MG/1
TABLET, FILM COATED ORAL
COMMUNITY
Start: 2024-09-10

## 2024-10-14 RX ORDER — FLUTICASONE PROPIONATE 50 MCG
2 SPRAY, SUSPENSION (ML) NASAL DAILY
Qty: 16 G | Refills: 2 | Status: SHIPPED | OUTPATIENT
Start: 2024-10-14

## 2024-10-14 SDOH — SOCIAL STABILITY: SOCIAL NETWORK: HOW OFTEN DO YOU GET TOGETHER WITH FRIENDS OR RELATIVES?: ONCE A WEEK

## 2024-10-14 SDOH — SOCIAL STABILITY: SOCIAL NETWORK: ARE YOU MARRIED, WIDOWED, DIVORCED, SEPARATED, NEVER MARRIED, OR LIVING WITH A PARTNER?: NEVER MARRIED

## 2024-10-14 SDOH — SOCIAL STABILITY: SOCIAL NETWORK
DO YOU BELONG TO ANY CLUBS OR ORGANIZATIONS SUCH AS CHURCH GROUPS UNIONS, FRATERNAL OR ATHLETIC GROUPS, OR SCHOOL GROUPS?: NO

## 2024-10-14 SDOH — HEALTH STABILITY: PHYSICAL HEALTH: ON AVERAGE, HOW MANY MINUTES DO YOU ENGAGE IN EXERCISE AT THIS LEVEL?: 0 MIN

## 2024-10-14 SDOH — SOCIAL STABILITY: SOCIAL NETWORK: HOW OFTEN DO YOU ATTENT MEETINGS OF THE CLUB OR ORGANIZATION YOU BELONG TO?: NEVER

## 2024-10-14 SDOH — SOCIAL STABILITY: SOCIAL NETWORK: IN A TYPICAL WEEK, HOW MANY TIMES DO YOU TALK ON THE PHONE WITH FAMILY, FRIENDS, OR NEIGHBORS?: ONCE A WEEK

## 2024-10-14 SDOH — SOCIAL STABILITY: SOCIAL NETWORK: HOW OFTEN DO YOU ATTEND CHURCH OR RELIGIOUS SERVICES?: 1 TO 4 TIMES PER YEAR

## 2024-10-14 ASSESSMENT — PATIENT HEALTH QUESTIONNAIRE - PHQ9
SUM OF ALL RESPONSES TO PHQ QUESTIONS 1-9: 0
SUM OF ALL RESPONSES TO PHQ9 QUESTIONS 1 & 2: 0
SUM OF ALL RESPONSES TO PHQ QUESTIONS 1-9: 0
SUM OF ALL RESPONSES TO PHQ QUESTIONS 1-9: 0
2. FEELING DOWN, DEPRESSED OR HOPELESS: NOT AT ALL
SUM OF ALL RESPONSES TO PHQ QUESTIONS 1-9: 0
1. LITTLE INTEREST OR PLEASURE IN DOING THINGS: NOT AT ALL

## 2024-10-14 NOTE — PROGRESS NOTES
Patient is currently due for Influenza  Patient educated on importance on being up to date with the adult  immunizations; patient in agreement to receive immunizations; denies fever, egg allergy nor past reactions to any injection or immunization.  Immunization given per CDC protocol and recorded in [facility].  VIS information sheet given, explained possible S/E.  Reviewed sx indicating need to be seen in ER.  Pt had no adverse reaction at time of discharge. CELESTINO Layne

## 2024-10-14 NOTE — PROGRESS NOTES
Ritika Carrasco seen at discharge. Full name and  verified; After visit Summary was given and reviewed with patient. RN advised patient when provider recommends to return for follow-up visit in 2 weeks. RN reviewed the provider's instructions with the patient, including medication instructions. Patient verbalized her understanding and denies having any further questions at this time.   Due to language barrier, an  ID: [id number]  assisted during this encounter. Escorted patient to lab area to have labs and flu vaccine completed today. Good Rx coupon  provided to patient for the prescribed medication(s). Patient instructed to provide coupon  to pharmacy cashier before paying to receive their discount.  Vaccine questionnaire completed. Advised patient of importance to avoid NSAIDs.   Rosamaria Umaña, RN

## 2024-10-14 NOTE — PROGRESS NOTES
\"Have you been to the ER, urgent care clinic since your last visit?  Hospitalized since your last visit?\"    Yes,  10/09/2024,  [facility], nose bleeds.    “Have you seen or consulted any other health care providers outside our system since your last visit?”    NO      “Have you had a diabetic eye exam?”    NO     No diabetic eye exam on file          Results for orders placed or performed in visit on 10/14/24   AMB POC HEMOGLOBIN (HGB)   Result Value Ref Range    Hemoglobin, POC 7.2 G/DL   AMB POC GLUCOSE BLOOD, BY GLUCOSE MONITORING DEVICE   Result Value Ref Range    Glucose,  MG/DL     Non fasttng

## 2024-10-28 ENCOUNTER — OFFICE VISIT (OUTPATIENT)
Age: 34
End: 2024-10-28

## 2024-10-28 VITALS
SYSTOLIC BLOOD PRESSURE: 127 MMHG | HEART RATE: 88 BPM | WEIGHT: 277.2 LBS | OXYGEN SATURATION: 100 % | BODY MASS INDEX: 40.94 KG/M2 | DIASTOLIC BLOOD PRESSURE: 63 MMHG | TEMPERATURE: 97.9 F

## 2024-10-28 DIAGNOSIS — Z79.4 TYPE 2 DIABETES MELLITUS WITHOUT COMPLICATION, WITH LONG-TERM CURRENT USE OF INSULIN (HCC): Primary | ICD-10-CM

## 2024-10-28 DIAGNOSIS — D50.9 IRON DEFICIENCY ANEMIA, UNSPECIFIED IRON DEFICIENCY ANEMIA TYPE: ICD-10-CM

## 2024-10-28 DIAGNOSIS — R04.0 EPISTAXIS: ICD-10-CM

## 2024-10-28 DIAGNOSIS — E11.9 TYPE 2 DIABETES MELLITUS WITHOUT COMPLICATION, WITH LONG-TERM CURRENT USE OF INSULIN (HCC): Primary | ICD-10-CM

## 2024-10-28 DIAGNOSIS — Z71.89 COUNSELING AND COORDINATION OF CARE: Primary | ICD-10-CM

## 2024-10-28 LAB
GLUCOSE, POC: 82 MG/DL
HEMOGLOBIN, POC: 9.8 G/DL

## 2024-10-28 PROCEDURE — 3051F HG A1C>EQUAL 7.0%<8.0%: CPT | Performed by: PHYSICIAN ASSISTANT

## 2024-10-28 PROCEDURE — 99213 OFFICE O/P EST LOW 20 MIN: CPT | Performed by: PHYSICIAN ASSISTANT

## 2024-10-28 PROCEDURE — 85018 HEMOGLOBIN: CPT | Performed by: PHYSICIAN ASSISTANT

## 2024-10-28 PROCEDURE — 82962 GLUCOSE BLOOD TEST: CPT | Performed by: PHYSICIAN ASSISTANT

## 2024-10-28 RX ORDER — SODIUM CHLORIDE/ALOE VERA
GEL (GRAM) NASAL
Qty: 14 G | Refills: 3 | Status: SHIPPED | OUTPATIENT
Start: 2024-10-28

## 2024-10-28 SDOH — ECONOMIC STABILITY: TRANSPORTATION INSECURITY
IN THE PAST 12 MONTHS, HAS LACK OF TRANSPORTATION KEPT YOU FROM MEETINGS, WORK, OR FROM GETTING THINGS NEEDED FOR DAILY LIVING?: NO

## 2024-10-28 SDOH — ECONOMIC STABILITY: FOOD INSECURITY: WITHIN THE PAST 12 MONTHS, THE FOOD YOU BOUGHT JUST DIDN'T LAST AND YOU DIDN'T HAVE MONEY TO GET MORE.: NEVER TRUE

## 2024-10-28 SDOH — ECONOMIC STABILITY: FOOD INSECURITY: WITHIN THE PAST 12 MONTHS, YOU WORRIED THAT YOUR FOOD WOULD RUN OUT BEFORE YOU GOT MONEY TO BUY MORE.: NEVER TRUE

## 2024-10-28 ASSESSMENT — PATIENT HEALTH QUESTIONNAIRE - PHQ9
1. LITTLE INTEREST OR PLEASURE IN DOING THINGS: NOT AT ALL
SUM OF ALL RESPONSES TO PHQ QUESTIONS 1-9: 0
SUM OF ALL RESPONSES TO PHQ QUESTIONS 1-9: 0
2. FEELING DOWN, DEPRESSED OR HOPELESS: NOT AT ALL
SUM OF ALL RESPONSES TO PHQ QUESTIONS 1-9: 0
SUM OF ALL RESPONSES TO PHQ9 QUESTIONS 1 & 2: 0
SUM OF ALL RESPONSES TO PHQ QUESTIONS 1-9: 0

## 2024-10-28 ASSESSMENT — SOCIAL DETERMINANTS OF HEALTH (SDOH)
WITHIN THE LAST YEAR, HAVE TO BEEN RAPED OR FORCED TO HAVE ANY KIND OF SEXUAL ACTIVITY BY YOUR PARTNER OR EX-PARTNER?: NO
WITHIN THE LAST YEAR, HAVE YOU BEEN HUMILIATED OR EMOTIONALLY ABUSED IN OTHER WAYS BY YOUR PARTNER OR EX-PARTNER?: NO
WITHIN THE LAST YEAR, HAVE YOU BEEN KICKED, HIT, SLAPPED, OR OTHERWISE PHYSICALLY HURT BY YOUR PARTNER OR EX-PARTNER?: NO
WITHIN THE LAST YEAR, HAVE YOU BEEN AFRAID OF YOUR PARTNER OR EX-PARTNER?: NO

## 2024-10-28 NOTE — PROGRESS NOTES
Ritika Carrasco seen at d/c, full name and  verified, given After visit Summary and reviewed today's visit with patient along with instructions on when it is recommended to come back.

## 2024-10-28 NOTE — PROGRESS NOTES
session code 30002     Chief Complaint   Patient presents with    Anemia     Follow up for Hgb recheck      Vitals:    10/28/24 1105   BP: 127/63   Site: Left Upper Arm   Position: Sitting   Pulse: 88   Temp: 97.9 °F (36.6 °C)   TempSrc: Temporal   SpO2: 100%   Weight: 125.7 kg (277 lb 3.2 oz)     Non-fasting glucose; coffee this morning  Results for orders placed or performed in visit on 10/28/24   AMB POC GLUCOSE BLOOD, BY GLUCOSE MONITORING DEVICE   Result Value Ref Range    Glucose, POC 82 MG/DL   AMB POC HEMOGLOBIN (HGB)   Result Value Ref Range    Hemoglobin, POC 9.8 G/DL         \"Have you been to the ER, urgent care clinic since your last visit?  Hospitalized since your last visit?\"    NO    “Have you seen or consulted any other health care providers outside our system since your last visit?”    NO      “Have you had a diabetic eye exam?”    NO     No diabetic eye exam on file

## 2024-10-28 NOTE — PROGRESS NOTES
Assessment/Plan:    Ritika was seen today for anemia.    Diagnoses and all orders for this visit:    Type 2 diabetes mellitus without complication, with long-term current use of insulin (HCC)  -     AMB POC GLUCOSE BLOOD, BY GLUCOSE MONITORING DEVICE    Iron deficiency anemia, unspecified iron deficiency anemia type  -     AMB POC HEMOGLOBIN (HGB)    Continue using flonase and use nasal saline PRN  Keep nares moist   She has had her flu shot  Return in 1 month for recheck hemoglobin  She is aware of her appt with Dr Romo 12/24    No follow-up provider specified.    CHARLES Suárez expressed understanding of this plan. An AVS was printed and given to the patient.      ----------------------------------------------------------------------    Chief Complaint   Patient presents with    Anemia     Follow up for Hgb recheck        History of Present Illness:  Pt presents for recheck on anemia due to heavy epistaxis  Her menstrual periods are currently controlled with medication from gyn doc. They are not heavy now  She has had one \"light\" nose bleed since her last visit with me. Her hemoglobin is up fron 7.2 on 10/14/24 (on POC lab) to 9.8 today  She feels well  She reports that she was \"scratching\" her nose from the outside and it caused the nose bleed a few weeks ago. It lasted for 5 min and was light bleeding only         Past Medical History:   Diagnosis Date    Obesity     Type 2 diabetes mellitus ([facility]) 10/2021       Current Outpatient Medications   Medication Sig Dispense Refill    ferrous sulfate (IRON 325) 325 (65 Fe) MG tablet Take 1 tablet by mouth 2 times daily 180 tablet 0    fluticasone (FLONASE) 50 MCG/ACT nasal spray 2 sprays by Each Nostril route daily 16 g 2    insulin NPH (HUMULIN N;NOVOLIN N) 100 UNIT/ML injection vial Inject 65 Units into the skin nightly Max dose 80U daily. Via PAP 6 each 3    metFORMIN (GLUCOPHAGE-XR) 500 MG extended release tablet 2 po bid ac

## 2024-10-28 NOTE — PROGRESS NOTES
Patient was a walk in today for assistance with medical bills. Patient has been screened and scheduled for Spring on 10/30/24 to complete BS FA application.

## 2024-10-30 ENCOUNTER — OFFICE VISIT (OUTPATIENT)
Age: 34
End: 2024-10-30

## 2024-10-30 DIAGNOSIS — Z71.89 COUNSELING AND COORDINATION OF CARE: Primary | ICD-10-CM

## 2024-10-30 NOTE — PROGRESS NOTES
BS FA application has been completed. Patient will bring complete application with supporting documents to [facility].

## 2024-11-20 ENCOUNTER — TELEPHONE (OUTPATIENT)
Age: 34
End: 2024-11-20

## 2024-11-27 ENCOUNTER — LAB (OUTPATIENT)
Age: 34
End: 2024-11-27

## 2024-11-27 ENCOUNTER — HOSPITAL ENCOUNTER (OUTPATIENT)
Facility: HOSPITAL | Age: 34
Setting detail: SPECIMEN
Discharge: HOME | End: 2024-11-30

## 2024-11-27 DIAGNOSIS — E78.2 MIXED HYPERLIPIDEMIA: ICD-10-CM

## 2024-11-27 DIAGNOSIS — Z79.4 TYPE 2 DIABETES MELLITUS WITHOUT COMPLICATION, WITH LONG-TERM CURRENT USE OF INSULIN (HCC): ICD-10-CM

## 2024-11-27 DIAGNOSIS — E11.9 TYPE 2 DIABETES MELLITUS WITHOUT COMPLICATION, WITH LONG-TERM CURRENT USE OF INSULIN (HCC): ICD-10-CM

## 2024-11-27 DIAGNOSIS — E28.2 PCOS (POLYCYSTIC OVARIAN SYNDROME): ICD-10-CM

## 2024-11-27 LAB
ALBUMIN SERPL-MCNC: 3.5 G/DL (ref 3.5–5)
ALBUMIN/GLOB SERPL: 1 (ref 1.1–2.2)
ALP SERPL-CCNC: 72 U/L (ref 45–117)
ALT SERPL-CCNC: 67 U/L (ref 12–78)
ANION GAP SERPL CALC-SCNC: 8 MMOL/L (ref 2–12)
AST SERPL-CCNC: 38 U/L (ref 15–37)
BILIRUB SERPL-MCNC: 0.3 MG/DL (ref 0.2–1)
BUN SERPL-MCNC: 12 MG/DL (ref 6–20)
BUN/CREAT SERPL: 20 (ref 12–20)
CALCIUM SERPL-MCNC: 9.3 MG/DL (ref 8.5–10.1)
CHLORIDE SERPL-SCNC: 105 MMOL/L (ref 97–108)
CHOLEST SERPL-MCNC: 230 MG/DL
CO2 SERPL-SCNC: 25 MMOL/L (ref 21–32)
CREAT SERPL-MCNC: 0.59 MG/DL (ref 0.55–1.02)
CREAT UR-MCNC: 107 MG/DL
EST. AVERAGE GLUCOSE BLD GHB EST-MCNC: 108 MG/DL
GLOBULIN SER CALC-MCNC: 3.4 G/DL (ref 2–4)
GLUCOSE SERPL-MCNC: 75 MG/DL (ref 65–100)
HBA1C MFR BLD: 5.4 % (ref 4–5.6)
HDLC SERPL-MCNC: 63 MG/DL
HDLC SERPL: 3.7 (ref 0–5)
LDLC SERPL CALC-MCNC: 147.2 MG/DL (ref 0–100)
MICROALBUMIN UR-MCNC: 5.9 MG/DL
MICROALBUMIN/CREAT UR-RTO: 55 MG/G (ref 0–30)
POTASSIUM SERPL-SCNC: 3.9 MMOL/L (ref 3.5–5.1)
PROT SERPL-MCNC: 6.9 G/DL (ref 6.4–8.2)
SODIUM SERPL-SCNC: 138 MMOL/L (ref 136–145)
TRIGL SERPL-MCNC: 99 MG/DL
VLDLC SERPL CALC-MCNC: 19.8 MG/DL

## 2024-11-27 PROCEDURE — 82570 ASSAY OF URINE CREATININE: CPT

## 2024-11-27 PROCEDURE — 80053 COMPREHEN METABOLIC PANEL: CPT

## 2024-11-27 PROCEDURE — 80061 LIPID PANEL: CPT

## 2024-11-27 PROCEDURE — 83036 HEMOGLOBIN GLYCOSYLATED A1C: CPT

## 2024-11-27 PROCEDURE — 82043 UR ALBUMIN QUANTITATIVE: CPT

## 2024-11-27 NOTE — PROGRESS NOTES
Chief Complaint   Patient presents with    Lab Collection     CMP, Lipid, Hemoglobin A1C and Microalbumin as ordered by Dr. Romo.       Patient name and date of birth verified.  Patient is fasting.  All labs collected and urine with no difficulty.  Labs obtained, labeled , processed and packaged for transport.  Maria G Manriquez LPN

## 2024-12-04 ENCOUNTER — CLINICAL DOCUMENTATION (OUTPATIENT)
Age: 34
End: 2024-12-04

## 2024-12-04 ENCOUNTER — OFFICE VISIT (OUTPATIENT)
Age: 34
End: 2024-12-04

## 2024-12-04 VITALS
HEART RATE: 81 BPM | TEMPERATURE: 98.2 F | BODY MASS INDEX: 36.77 KG/M2 | OXYGEN SATURATION: 96 % | DIASTOLIC BLOOD PRESSURE: 78 MMHG | SYSTOLIC BLOOD PRESSURE: 142 MMHG | WEIGHT: 249 LBS

## 2024-12-04 VITALS
DIASTOLIC BLOOD PRESSURE: 84 MMHG | HEART RATE: 67 BPM | TEMPERATURE: 97.9 F | BODY MASS INDEX: 36.77 KG/M2 | SYSTOLIC BLOOD PRESSURE: 133 MMHG | WEIGHT: 249 LBS

## 2024-12-04 DIAGNOSIS — Z79.4 TYPE 2 DIABETES MELLITUS WITHOUT COMPLICATION, WITH LONG-TERM CURRENT USE OF INSULIN (HCC): Primary | ICD-10-CM

## 2024-12-04 DIAGNOSIS — E11.9 TYPE 2 DIABETES MELLITUS WITHOUT COMPLICATION, WITH LONG-TERM CURRENT USE OF INSULIN (HCC): Primary | ICD-10-CM

## 2024-12-04 DIAGNOSIS — D50.9 IRON DEFICIENCY ANEMIA, UNSPECIFIED IRON DEFICIENCY ANEMIA TYPE: ICD-10-CM

## 2024-12-04 DIAGNOSIS — E78.2 MIXED HYPERLIPIDEMIA: ICD-10-CM

## 2024-12-04 LAB
GLUCOSE, POC: 111 MG/DL
HEMOGLOBIN, POC: 12.5 G/DL

## 2024-12-04 PROCEDURE — 85018 HEMOGLOBIN: CPT | Performed by: NURSE PRACTITIONER

## 2024-12-04 PROCEDURE — 99214 OFFICE O/P EST MOD 30 MIN: CPT | Performed by: INTERNAL MEDICINE

## 2024-12-04 PROCEDURE — 3044F HG A1C LEVEL LT 7.0%: CPT | Performed by: INTERNAL MEDICINE

## 2024-12-04 PROCEDURE — 82962 GLUCOSE BLOOD TEST: CPT | Performed by: NURSE PRACTITIONER

## 2024-12-04 PROCEDURE — 3044F HG A1C LEVEL LT 7.0%: CPT | Performed by: NURSE PRACTITIONER

## 2024-12-04 PROCEDURE — 99213 OFFICE O/P EST LOW 20 MIN: CPT | Performed by: NURSE PRACTITIONER

## 2024-12-04 ASSESSMENT — PATIENT HEALTH QUESTIONNAIRE - PHQ9
SUM OF ALL RESPONSES TO PHQ QUESTIONS 1-9: 0
SUM OF ALL RESPONSES TO PHQ9 QUESTIONS 1 & 2: 0
SUM OF ALL RESPONSES TO PHQ QUESTIONS 1-9: 0
2. FEELING DOWN, DEPRESSED OR HOPELESS: NOT AT ALL
1. LITTLE INTEREST OR PLEASURE IN DOING THINGS: NOT AT ALL
SUM OF ALL RESPONSES TO PHQ QUESTIONS 1-9: 0
2. FEELING DOWN, DEPRESSED OR HOPELESS: NOT AT ALL
SUM OF ALL RESPONSES TO PHQ9 QUESTIONS 1 & 2: 0
1. LITTLE INTEREST OR PLEASURE IN DOING THINGS: NOT AT ALL
SUM OF ALL RESPONSES TO PHQ QUESTIONS 1-9: 0

## 2024-12-04 NOTE — PROGRESS NOTES
session code [id number]   Chief Complaint   Patient presents with    Diabetes     F/u     Vitals:    12/04/24 1318 12/04/24 1329   BP: (!) 147/90 (!) 159/89   Site: Left Upper Arm Left Upper Arm   Position: Sitting Sitting   Pulse: 81    Temp: 98.2 °F (36.8 °C)    TempSrc: Temporal    SpO2: 96%    Weight: 112.9 kg (249 lb)      Results for orders placed or performed in visit on 12/04/24   AMB POC HEMOGLOBIN (HGB)   Result Value Ref Range    Hemoglobin, POC 12.5 G/DL   AMB POC GLUCOSE BLOOD, BY GLUCOSE MONITORING DEVICE   Result Value Ref Range    Glucose,  MG/DL       \"Have you been to the ER, urgent care clinic since your last visit?  Hospitalized since your last visit?\"    NO    “Have you seen or consulted any other health care providers outside our system since your last visit?”    NO      “Have you had a diabetic eye exam?”    NO     No diabetic eye exam on file

## 2024-12-04 NOTE — PROGRESS NOTES
Chief Complaint   Patient presents with    Diabetes     History of Present Illness: Ritika Carrasco is a 34 y.o. female here for follow up of diabetes.  Weight down 19 lbs since last visit in person in 3/25.  Has been working on her diet and cutting back on her carbs over the past month and has decreased her NPH to 60 units at night.  Her fasting sugars are mostly in the  range but it was 75 on the day of the lab draw but felt fine.  Compliant with metformin.  Was found to be anemic in October with a Hgb of 7.2 and saw Apurva and started on iron and her Hgb was up to 12.5 today.  Still desires pregnancy and has taken letrozole to help with this.      Current Outpatient Medications   Medication Sig    insulin NPH (HUMULIN N;NOVOLIN N) 100 UNIT/ML injection vial Inject 60 Units into the skin nightly Max dose 80U daily. Via PAP--Dose change 12/04/24--updated med list--did not send prescription to the pharmacy    ferrous sulfate (IRON 325) 325 (65 Fe) MG tablet Take 1 tablet by mouth 2 times daily    saline nasal gel (AYR) GEL Sig: use PRN up to five times a day to keep nares moist    fluticasone (FLONASE) 50 MCG/ACT nasal spray 2 sprays by Each Nostril route daily    metFORMIN (GLUCOPHAGE-XR) 500 MG extended release tablet 2 po bid ac breakfast and dinner for diabetes.  Valentine 2 tab antes de desayuno y 2 tab antes de la kamla para diabetes.     No current facility-administered medications for this visit.     No Known Allergies    Review of Systems: PER HPI    Physical Examination:  Blood pressure 133/84, pulse 67, temperature 97.9 °F (36.6 °C), temperature source Temporal, weight 112.9 kg (249 lb), last menstrual period 11/26/2024.  General: pleasant, no distress, good eye contact   Neck: no carotid bruits  Cardiovascular: regular, normal rate, nl s1 and s2, no m/r/g,   Respiratory: clear bilaterally  Integumentary: no edema,   Psychiatric: normal mood and affect    Data Reviewed:   Component      Latest

## 2024-12-04 NOTE — PROGRESS NOTES
Chief Complaint   Patient presents with    Diabetes     1. Have you been to the ER, urgent care clinic since your last visit?  Hospitalized since your last visit?No    2. Have you seen or consulted any other health care providers outside of the [facility] since your last visit?  Include any pap smears or colon screening. No  /84 (Site: Right Upper Arm, Position: Sitting, Cuff Size: Large Adult)   Pulse 67   Temp 97.9 °F (36.6 °C) (Temporal)   Wt 112.9 kg (249 lb)   LMP 11/26/2024   BMI 36.77 kg/m²

## 2024-12-04 NOTE — PROGRESS NOTES
Patient discharged with the After Visit Summary by ANA Spence RN. Patient prepped for next appointment with Dr. MEENU Romo.

## 2024-12-04 NOTE — PROGRESS NOTES
Patient is enrolled in the [facility] patient assistance program through 09- for Humulin N vials, 100u/ml, 10ml/vial, max daily dose of 80u, 3 month supply with 3 refills automatically shipped to the patient's home. Annette Loyloa RN

## 2024-12-04 NOTE — PATIENT INSTRUCTIONS
1) Newby A1c ahora esta normal.  Sigue tomando 60 unidades de NPH antes de dormir.  Si newby azucar esta menos de 80 2 veces o mas en georgie semana, entonces eliza 5 unidades menos.    2) Newby presion esta normal y rogelio rinones esta normal y newby proteina en newby urino esta normal.    3) Newby colesterol esta alto natan es mas o menos lo mismo.

## 2024-12-04 NOTE — PROGRESS NOTES
Name and  confirmed w/ patient. An After Visit Summary was provided and all discharge instructions were reviewed with the patient including: f/up appt and refills. Time for questions and answers provided, patient verbalized understanding. Patient discharged from clinic in stable condition.  details per consult note.

## 2025-03-27 ENCOUNTER — TELEPHONE (OUTPATIENT)
Age: 35
End: 2025-03-27

## 2025-03-27 NOTE — TELEPHONE ENCOUNTER
Called patient to reschedule lab appt for Dr. Romo for 04.02.25. Appt was rescheduled to 04.03.25 as there will not be a nurse available to draw labs. Patient verbalized understanding and all appt info provided to patient.    Thank you,  Abiola

## 2025-04-08 ENCOUNTER — HOSPITAL ENCOUNTER (OUTPATIENT)
Facility: HOSPITAL | Age: 35
Setting detail: SPECIMEN
Discharge: HOME | End: 2025-04-11

## 2025-04-08 ENCOUNTER — LAB (OUTPATIENT)
Age: 35
End: 2025-04-08

## 2025-04-08 DIAGNOSIS — E78.2 MIXED HYPERLIPIDEMIA: ICD-10-CM

## 2025-04-08 DIAGNOSIS — E11.9 TYPE 2 DIABETES MELLITUS WITHOUT COMPLICATION, WITH LONG-TERM CURRENT USE OF INSULIN: ICD-10-CM

## 2025-04-08 DIAGNOSIS — Z13.9 ENCOUNTER FOR SCREENING: Primary | ICD-10-CM

## 2025-04-08 DIAGNOSIS — Z79.4 TYPE 2 DIABETES MELLITUS WITHOUT COMPLICATION, WITH LONG-TERM CURRENT USE OF INSULIN: ICD-10-CM

## 2025-04-08 LAB
CREAT UR-MCNC: 174 MG/DL
MICROALBUMIN UR-MCNC: 3.47 MG/DL
MICROALBUMIN/CREAT UR-RTO: 20 MG/G (ref 0–30)
SPECIMEN HOLD: NORMAL

## 2025-04-08 PROCEDURE — 82570 ASSAY OF URINE CREATININE: CPT

## 2025-04-08 PROCEDURE — 82043 UR ALBUMIN QUANTITATIVE: CPT

## 2025-04-08 PROCEDURE — 80053 COMPREHEN METABOLIC PANEL: CPT

## 2025-04-08 PROCEDURE — 80061 LIPID PANEL: CPT

## 2025-04-08 PROCEDURE — 83036 HEMOGLOBIN GLYCOSYLATED A1C: CPT

## 2025-04-08 NOTE — PROGRESS NOTES
Patient presented to clinic for lab collection. Name and  verified. Labs obtained as ordered by provider. Patient tolerated procedure well. RA MOODY RN

## 2025-04-09 LAB
ALBUMIN SERPL-MCNC: 3.7 G/DL (ref 3.5–5)
ALBUMIN/GLOB SERPL: 1.1 (ref 1.1–2.2)
ALP SERPL-CCNC: 112 U/L (ref 45–117)
ALT SERPL-CCNC: 24 U/L (ref 12–78)
ANION GAP SERPL CALC-SCNC: 4 MMOL/L (ref 2–12)
AST SERPL-CCNC: 18 U/L (ref 15–37)
BILIRUB SERPL-MCNC: 0.6 MG/DL (ref 0.2–1)
BUN SERPL-MCNC: 15 MG/DL (ref 6–20)
BUN/CREAT SERPL: 23 (ref 12–20)
CALCIUM SERPL-MCNC: 9.3 MG/DL (ref 8.5–10.1)
CHLORIDE SERPL-SCNC: 104 MMOL/L (ref 97–108)
CHOLEST SERPL-MCNC: 244 MG/DL
CO2 SERPL-SCNC: 28 MMOL/L (ref 21–32)
CREAT SERPL-MCNC: 0.66 MG/DL (ref 0.55–1.02)
EST. AVERAGE GLUCOSE BLD GHB EST-MCNC: 134 MG/DL
GLOBULIN SER CALC-MCNC: 3.4 G/DL (ref 2–4)
GLUCOSE SERPL-MCNC: 92 MG/DL (ref 65–100)
HBA1C MFR BLD: 6.3 % (ref 4–5.6)
HDLC SERPL-MCNC: 68 MG/DL
HDLC SERPL: 3.6 (ref 0–5)
LDLC SERPL CALC-MCNC: 159.6 MG/DL (ref 0–100)
POTASSIUM SERPL-SCNC: 4.2 MMOL/L (ref 3.5–5.1)
PROT SERPL-MCNC: 7.1 G/DL (ref 6.4–8.2)
SODIUM SERPL-SCNC: 136 MMOL/L (ref 136–145)
TRIGL SERPL-MCNC: 82 MG/DL
VLDLC SERPL CALC-MCNC: 16.4 MG/DL

## 2025-04-16 ENCOUNTER — OFFICE VISIT (OUTPATIENT)
Age: 35
End: 2025-04-16

## 2025-04-16 ENCOUNTER — RESULTS FOLLOW-UP (OUTPATIENT)
Age: 35
End: 2025-04-16

## 2025-04-16 VITALS
WEIGHT: 244.8 LBS | OXYGEN SATURATION: 100 % | DIASTOLIC BLOOD PRESSURE: 66 MMHG | SYSTOLIC BLOOD PRESSURE: 127 MMHG | HEART RATE: 70 BPM | TEMPERATURE: 98.4 F | BODY MASS INDEX: 36.15 KG/M2

## 2025-04-16 DIAGNOSIS — E78.2 MIXED HYPERLIPIDEMIA: ICD-10-CM

## 2025-04-16 DIAGNOSIS — E11.9 TYPE 2 DIABETES MELLITUS WITHOUT COMPLICATION, WITH LONG-TERM CURRENT USE OF INSULIN: Primary | ICD-10-CM

## 2025-04-16 DIAGNOSIS — Z79.4 TYPE 2 DIABETES MELLITUS WITHOUT COMPLICATION, WITH LONG-TERM CURRENT USE OF INSULIN: Primary | ICD-10-CM

## 2025-04-16 DIAGNOSIS — D50.9 IRON DEFICIENCY ANEMIA, UNSPECIFIED IRON DEFICIENCY ANEMIA TYPE: ICD-10-CM

## 2025-04-16 PROCEDURE — 3044F HG A1C LEVEL LT 7.0%: CPT | Performed by: INTERNAL MEDICINE

## 2025-04-16 PROCEDURE — 99214 OFFICE O/P EST MOD 30 MIN: CPT | Performed by: INTERNAL MEDICINE

## 2025-04-16 RX ORDER — METFORMIN HYDROCHLORIDE 500 MG/1
TABLET, EXTENDED RELEASE ORAL
Qty: 360 TABLET | Refills: 3 | Status: SHIPPED | OUTPATIENT
Start: 2025-04-16

## 2025-04-16 SDOH — ECONOMIC STABILITY: FOOD INSECURITY: WITHIN THE PAST 12 MONTHS, THE FOOD YOU BOUGHT JUST DIDN'T LAST AND YOU DIDN'T HAVE MONEY TO GET MORE.: NEVER TRUE

## 2025-04-16 SDOH — ECONOMIC STABILITY: FOOD INSECURITY: WITHIN THE PAST 12 MONTHS, YOU WORRIED THAT YOUR FOOD WOULD RUN OUT BEFORE YOU GOT MONEY TO BUY MORE.: NEVER TRUE

## 2025-04-16 ASSESSMENT — SOCIAL DETERMINANTS OF HEALTH (SDOH)
WITHIN THE LAST YEAR, HAVE YOU BEEN AFRAID OF YOUR PARTNER OR EX-PARTNER?: NO
WITHIN THE LAST YEAR, HAVE YOU BEEN HUMILIATED OR EMOTIONALLY ABUSED IN OTHER WAYS BY YOUR PARTNER OR EX-PARTNER?: NO
WITHIN THE LAST YEAR, HAVE TO BEEN RAPED OR FORCED TO HAVE ANY KIND OF SEXUAL ACTIVITY BY YOUR PARTNER OR EX-PARTNER?: NO
WITHIN THE LAST YEAR, HAVE YOU BEEN KICKED, HIT, SLAPPED, OR OTHERWISE PHYSICALLY HURT BY YOUR PARTNER OR EX-PARTNER?: NO
HOW HARD IS IT FOR YOU TO PAY FOR THE VERY BASICS LIKE FOOD, HOUSING, MEDICAL CARE, AND HEATING?: NOT HARD AT ALL

## 2025-04-16 ASSESSMENT — PATIENT HEALTH QUESTIONNAIRE - PHQ9
SUM OF ALL RESPONSES TO PHQ QUESTIONS 1-9: 0
SUM OF ALL RESPONSES TO PHQ QUESTIONS 1-9: 0
1. LITTLE INTEREST OR PLEASURE IN DOING THINGS: NOT AT ALL
2. FEELING DOWN, DEPRESSED OR HOPELESS: NOT AT ALL
SUM OF ALL RESPONSES TO PHQ QUESTIONS 1-9: 0
SUM OF ALL RESPONSES TO PHQ QUESTIONS 1-9: 0

## 2025-04-16 ASSESSMENT — LIFESTYLE VARIABLES
HOW OFTEN DO YOU HAVE A DRINK CONTAINING ALCOHOL: NEVER
HOW MANY STANDARD DRINKS CONTAINING ALCOHOL DO YOU HAVE ON A TYPICAL DAY: PATIENT DOES NOT DRINK

## 2025-04-16 NOTE — PROGRESS NOTES
session code [id number]   Chief Complaint   Patient presents with    Diabetes     Follow up. Needs metformin refill      Vitals:    04/16/25 1340   BP: 127/66   BP Site: Right Upper Arm   Patient Position: Sitting   Pulse: 70   Temp: 98.4 °F (36.9 °C)   TempSrc: Temporal   SpO2: 100%   Weight: 111 kg (244 lb 12.8 oz)       \"Have you been to the ER, urgent care clinic since your last visit?  Hospitalized since your last visit?\"    NO    “Have you seen or consulted any other health care providers outside our system since your last visit?”    NO      “Have you had a diabetic eye exam?”    NO     No diabetic eye exam on file

## 2025-04-16 NOTE — PROGRESS NOTES
Chief Complaint   Patient presents with    Diabetes     Follow up. Needs metformin refill      History of Present Illness: Ritika Carrasco is a 35 y.o. female here for follow up of diabetes.  Weight down 4 lbs since last visit in .  Decreased the NPH from 60 units to 50 units in January when her sugars were in the 80s fasting and now they are in the 110s and given she still desires pregnancy, will try to push them under 95.  Compliant with metformin 2 tabs bid.  Only taking iron 1 tab daily at this time.  Her father  of MI so will need to treat her cholesterol once she is no longer considering conception.  She is under the care of [facility] specialist at [facility] and just stopped letrozole about a month ago.        Current Outpatient Medications   Medication Sig    ferrous sulfate (IRON 325) 325 (65 Fe) MG tablet Take 1 tablet by mouth 2 times daily (Patient taking differently: Take 1 tablet by mouth daily (with breakfast))    insulin NPH (HUMULIN N;NOVOLIN N) 100 UNIT/ML injection vial Inject 60 Units into the skin nightly Max dose 80U daily. Via PAP--Dose change 24--updated med list--did not send prescription to the pharmacy (Patient taking differently: Inject 50 Units into the skin nightly Max dose 80U daily. Via PAP--Dose change 24--updated med list--did not send prescription to the pharmacy)    saline nasal gel (AYR) GEL Sig: use PRN up to five times a day to keep nares moist    fluticasone (FLONASE) 50 MCG/ACT nasal spray 2 sprays by Each Nostril route daily    metFORMIN (GLUCOPHAGE-XR) 500 MG extended release tablet 2 po bid ac breakfast and dinner for diabetes.  Valentine 2 tab antes de desayuno y 2 tab antes de la kamla para diabetes.     No current facility-administered medications for this visit.     No Known Allergies    Review of Systems: PER HPI    Physical Examination:  Blood pressure 127/66, pulse 70, temperature 98.4 °F (36.9 °C), temperature source Temporal, weight 111 kg (244

## 2025-04-16 NOTE — PROGRESS NOTES
Name and  confirmed w/ patient. An After Visit Summary was provided and all discharge instructions were reviewed with the patient including: f/up appt, Goodrx coupon use, and refills. Time for questions and answers provided, patient verbalized understanding. Patient discharged from clinic in stable condition.

## 2025-04-16 NOTE — PATIENT INSTRUCTIONS
1) Injecta 55 unidades antes de dormir.  Si newby azucar esta menos de 95 despues de 4 jean baptiste, entonces sigue tomando esta dosis natan si azucar esta mas de 95, entonces ponga 60 unidades.    2) Sylwia rinones y higado y urino esta normal.    3) Newby colesterol esta alto natan vamos a controlar sin medicacion.

## 2025-04-30 ENCOUNTER — OFFICE VISIT (OUTPATIENT)
Age: 35
End: 2025-04-30

## 2025-04-30 VITALS
BODY MASS INDEX: 36.48 KG/M2 | TEMPERATURE: 98.1 F | SYSTOLIC BLOOD PRESSURE: 140 MMHG | HEART RATE: 73 BPM | OXYGEN SATURATION: 98 % | DIASTOLIC BLOOD PRESSURE: 84 MMHG | WEIGHT: 247 LBS

## 2025-04-30 DIAGNOSIS — Z79.4 TYPE 2 DIABETES MELLITUS WITHOUT COMPLICATION, WITH LONG-TERM CURRENT USE OF INSULIN (HCC): ICD-10-CM

## 2025-04-30 DIAGNOSIS — G56.03 BILATERAL CARPAL TUNNEL SYNDROME: Primary | ICD-10-CM

## 2025-04-30 DIAGNOSIS — E11.9 TYPE 2 DIABETES MELLITUS WITHOUT COMPLICATION, WITH LONG-TERM CURRENT USE OF INSULIN (HCC): ICD-10-CM

## 2025-04-30 DIAGNOSIS — D50.9 IRON DEFICIENCY ANEMIA, UNSPECIFIED IRON DEFICIENCY ANEMIA TYPE: ICD-10-CM

## 2025-04-30 LAB
GLUCOSE, POC: 86 MG/DL
HEMOGLOBIN, POC: 11.6 G/DL

## 2025-04-30 PROCEDURE — 3044F HG A1C LEVEL LT 7.0%: CPT | Performed by: FAMILY MEDICINE

## 2025-04-30 PROCEDURE — 99214 OFFICE O/P EST MOD 30 MIN: CPT | Performed by: FAMILY MEDICINE

## 2025-04-30 PROCEDURE — 85018 HEMOGLOBIN: CPT | Performed by: FAMILY MEDICINE

## 2025-04-30 PROCEDURE — 82962 GLUCOSE BLOOD TEST: CPT | Performed by: FAMILY MEDICINE

## 2025-04-30 RX ORDER — CYCLOBENZAPRINE HCL 10 MG
10 TABLET ORAL
Qty: 14 TABLET | Refills: 0 | Status: SHIPPED | OUTPATIENT
Start: 2025-04-30

## 2025-04-30 RX ORDER — CELECOXIB 200 MG/1
200 CAPSULE ORAL DAILY
Qty: 30 CAPSULE | Refills: 2 | Status: SHIPPED | OUTPATIENT
Start: 2025-04-30

## 2025-04-30 RX ORDER — METHYLPREDNISOLONE 4 MG/1
TABLET ORAL
Qty: 1 KIT | Refills: 0 | Status: SHIPPED | OUTPATIENT
Start: 2025-04-30 | End: 2025-05-06

## 2025-04-30 ASSESSMENT — ENCOUNTER SYMPTOMS
CONSTIPATION: 0
SHORTNESS OF BREATH: 0
ABDOMINAL PAIN: 0
COUGH: 0
NAUSEA: 0
DIARRHEA: 0

## 2025-04-30 NOTE — PROGRESS NOTES
Chief Complaint   Patient presents with    Carpal Tunnel     Patient stated pain, numbness, and tingling in the right hand and wrist     BP (!) 140/84 (BP Site: Right Upper Arm, Patient Position: Sitting, BP Cuff Size: Large Adult)   Pulse 73   Temp 98.1 °F (36.7 °C) (Temporal)   Wt 112 kg (247 lb)   LMP 03/16/2025 (Exact Date)   SpO2 98%   BMI 36.48 kg/m²   Have you been to the ER, urgent care clinic since your last visit?  Hospitalized since your last visit?   NO    Have you seen or consulted any other health care providers outside our system since your last visit?   NO      “Have you had a diabetic eye exam?”    NO       Results for orders placed or performed in visit on 04/30/25   AMB POC HEMOGLOBIN (HGB)   Result Value Ref Range    Hemoglobin, POC 11.6 G/DL   AMB POC GLUCOSE BLOOD, BY GLUCOSE MONITORING DEVICE   Result Value Ref Range    Glucose, POC 86 MG/DL        No diabetic eye exam on file     Zachary Flower RN        
Ritika Carrasco was seen at discharge. Patient verified their full name and . After visit Summary provided and reviewed with patient. RN advised patient when provider recommends to return for follow-up visit in 6 weeks. RN reviewed the provider's instructions with the patient, including medication instructions. Patient verbalized her understanding and denies having any further questions at this time.   GoodRx coupon  provided to patient for the prescribed medication(s). Patient instructed to present coupon  to pharmacy cashier before paying to receive their medication at a discounted renteria.   RA MOODY RN    
  Musculoskeletal:  Positive for arthralgias and myalgias.   Neurological:  Positive for numbness. Negative for dizziness and headaches.       BP (!) 140/84 (BP Site: Right Upper Arm, Patient Position: Sitting, BP Cuff Size: Large Adult)   Pulse 73   Temp 98.1 °F (36.7 °C) (Temporal)   Wt 112 kg (247 lb)   LMP 03/16/2025 (Exact Date)   SpO2 98%   BMI 36.48 kg/m²     Physical Exam  Constitutional:       General: She is not in acute distress.     Appearance: Normal appearance.   HENT:      Mouth/Throat:      Mouth: Mucous membranes are moist.      Pharynx: Oropharynx is clear. No oropharyngeal exudate or posterior oropharyngeal erythema.   Eyes:      Extraocular Movements: Extraocular movements intact.      Conjunctiva/sclera: Conjunctivae normal.      Pupils: Pupils are equal, round, and reactive to light.   Cardiovascular:      Rate and Rhythm: Normal rate and regular rhythm.      Pulses: Normal pulses.      Heart sounds: Normal heart sounds.   Pulmonary:      Effort: Pulmonary effort is normal.      Breath sounds: Normal breath sounds.   Musculoskeletal:      Right shoulder: No tenderness. Normal range of motion.      Left shoulder: No tenderness. Normal range of motion.      Right elbow: No swelling. Normal range of motion. No tenderness.      Left elbow: No swelling. Normal range of motion. No tenderness.      Right wrist: Tenderness present. No swelling. Normal range of motion.      Left wrist: Tenderness present. No swelling. Normal range of motion.      Right hand: Normal range of motion. Normal strength. Normal capillary refill.      Left hand: Normal range of motion. Normal strength. Normal capillary refill.      Cervical back: No tenderness.      Comments: +phalen.  -tinel    Perhaps mild thenar eminence wasting.   Lymphadenopathy:      Cervical: No cervical adenopathy.   Neurological:      Mental Status: She is alert and oriented to person, place, and time.

## 2025-06-03 RX ORDER — TERBINAFINE HYDROCHLORIDE 250 MG/1
250 TABLET ORAL DAILY
Qty: 30 TABLET | Refills: 0 | OUTPATIENT
Start: 2025-06-03

## 2025-07-01 ENCOUNTER — TELEPHONE (OUTPATIENT)
Age: 35
End: 2025-07-01

## 2025-07-02 ENCOUNTER — OFFICE VISIT (OUTPATIENT)
Age: 35
End: 2025-07-02

## 2025-07-02 VITALS
HEART RATE: 83 BPM | SYSTOLIC BLOOD PRESSURE: 121 MMHG | OXYGEN SATURATION: 99 % | WEIGHT: 250 LBS | DIASTOLIC BLOOD PRESSURE: 79 MMHG | TEMPERATURE: 98.2 F | BODY MASS INDEX: 36.92 KG/M2

## 2025-07-02 DIAGNOSIS — R20.2 PARESTHESIA OF BOTH HANDS: ICD-10-CM

## 2025-07-02 DIAGNOSIS — G56.03 BILATERAL CARPAL TUNNEL SYNDROME: Primary | ICD-10-CM

## 2025-07-02 PROCEDURE — 99213 OFFICE O/P EST LOW 20 MIN: CPT | Performed by: FAMILY MEDICINE

## 2025-07-02 RX ORDER — NAPROXEN 500 MG/1
500 TABLET ORAL 2 TIMES DAILY WITH MEALS
Qty: 60 TABLET | Refills: 1 | Status: SHIPPED | OUTPATIENT
Start: 2025-07-02

## 2025-07-02 ASSESSMENT — PATIENT HEALTH QUESTIONNAIRE - PHQ9
SUM OF ALL RESPONSES TO PHQ QUESTIONS 1-9: 0
2. FEELING DOWN, DEPRESSED OR HOPELESS: NOT AT ALL
SUM OF ALL RESPONSES TO PHQ QUESTIONS 1-9: 0
1. LITTLE INTEREST OR PLEASURE IN DOING THINGS: NOT AT ALL

## 2025-07-02 NOTE — PROGRESS NOTES
Ritika Carrasco is a 35 y.o. female   Chief Complaint   Patient presents with    Carpal Tunnel     Follow up         ASSESSMENT AND PLAN:    1. Bilateral carpal tunnel syndrome  - Worsening despite conservative treatment  - Refer to ortho via [facility]  - Stop celebrex and try naproxen to help mitigate symptoms until she is seen by ortho  - Continue bracing  - naproxen (NAPROSYN) 500 MG tablet; Take 1 tablet by mouth 2 times daily (with meals)  Dispense: 60 tablet; Refill: 1  - External Referral To Orthopedic Surgery    2. Paresthesia of both hands  - As above      SUBJECTIVE:    HPI:  Ritika Carrasco is a 35 y.o. female who presents to follow up carpal tunnel symptoms.     Saw Dr. Arvizu in April. Treated with medrol dose pack and then celebrex for carpal tunnel. Also tried on flexeril at night. Advised to try nighttime bracing and home exercises.    Feels like the meds havne't helped. Using two different othorpedic devices. Feels numbness in hands and all fingers, julee the first 3 fingers. Pain is worsening. Hasn't slept well for the last 3 days.   Has tried advil and tylenol in the past without much relief.       Review of Systems  Negative except as noted in the HPI.    /79   Pulse 83   Temp 98.2 °F (36.8 °C) (Temporal)   Wt 113.4 kg (250 lb)   LMP 06/15/2025   SpO2 99%   BMI 36.92 kg/m²     Physical Exam   Const: A&Ox3, NAD  Resp: CTABL, no wheezing  Card: regular rate and rhythm, no murmurs  Musk: FROM x 4 extremities, gait normal  Neuro: +Phalens, +Tinel bilaterally

## 2025-07-02 NOTE — CONSULTS
Session ID: [id number]  Session Duration: 13 minutes  Language: Vietnamese   ID: #[id number]   Name:  Geovany

## 2025-07-02 NOTE — PROGRESS NOTES
Pt's name and  verified. AVS provided. Medication reviewed and education provided. Good rx coupon provided and instructed on use. Pt informed of ortho surgery referral through [facility]. Patient was advised that she will be contacted by a [facility] Coordinator in regards to her orthopedic surgery referral. Patient was notified that [facility] has its own financial screening. Pt verbalizes understanding. Pt instructed to schedule follow up if symptoms worsen or do not improve. Time allowed for questions, all questions answered. Anel Albert RN

## 2025-07-15 ENCOUNTER — OFFICE VISIT (OUTPATIENT)
Age: 35
End: 2025-07-15

## 2025-07-15 DIAGNOSIS — Z71.89 COUNSELING AND COORDINATION OF CARE: Primary | ICD-10-CM

## 2025-07-15 PROCEDURE — 99080 SPECIAL REPORTS OR FORMS: CPT | Performed by: FAMILY MEDICINE

## 2025-07-15 NOTE — PROGRESS NOTES
[facility] financial screening is complete. Patient has been instructed to call [facility] appointment line on or after 8/5/25.

## 2025-08-12 ENCOUNTER — HOSPITAL ENCOUNTER (OUTPATIENT)
Facility: HOSPITAL | Age: 35
Setting detail: SPECIMEN
Discharge: HOME | End: 2025-08-15

## 2025-08-12 ENCOUNTER — LAB (OUTPATIENT)
Age: 35
End: 2025-08-12

## 2025-08-12 DIAGNOSIS — Z79.4 TYPE 2 DIABETES MELLITUS WITHOUT COMPLICATION, WITH LONG-TERM CURRENT USE OF INSULIN (HCC): ICD-10-CM

## 2025-08-12 DIAGNOSIS — E78.2 MIXED HYPERLIPIDEMIA: ICD-10-CM

## 2025-08-12 DIAGNOSIS — E11.9 TYPE 2 DIABETES MELLITUS WITHOUT COMPLICATION, WITH LONG-TERM CURRENT USE OF INSULIN (HCC): ICD-10-CM

## 2025-08-12 DIAGNOSIS — D50.9 IRON DEFICIENCY ANEMIA, UNSPECIFIED IRON DEFICIENCY ANEMIA TYPE: ICD-10-CM

## 2025-08-12 PROCEDURE — 83036 HEMOGLOBIN GLYCOSYLATED A1C: CPT

## 2025-08-12 PROCEDURE — 80061 LIPID PANEL: CPT

## 2025-08-12 PROCEDURE — 80048 BASIC METABOLIC PNL TOTAL CA: CPT

## 2025-08-13 LAB
ANION GAP SERPL CALC-SCNC: 10 MMOL/L (ref 2–14)
BUN SERPL-MCNC: 14 MG/DL (ref 6–20)
BUN/CREAT SERPL: 23 (ref 12–20)
CALCIUM SERPL-MCNC: 9.2 MG/DL (ref 8.6–10)
CHLORIDE SERPL-SCNC: 102 MMOL/L (ref 98–107)
CHOLEST SERPL-MCNC: 224 MG/DL (ref 0–200)
CO2 SERPL-SCNC: 25 MMOL/L (ref 20–29)
CREAT SERPL-MCNC: 0.62 MG/DL (ref 0.6–1)
EST. AVERAGE GLUCOSE BLD GHB EST-MCNC: 156 MG/DL
GLUCOSE SERPL-MCNC: 83 MG/DL (ref 65–100)
HBA1C MFR BLD: 7.1 % (ref 4–5.6)
HDLC SERPL-MCNC: 69 MG/DL (ref 40–60)
HDLC SERPL: 3.2 (ref 0–5)
LDLC SERPL CALC-MCNC: 135 MG/DL (ref 0–100)
POTASSIUM SERPL-SCNC: 4.4 MMOL/L (ref 3.5–5.1)
SODIUM SERPL-SCNC: 137 MMOL/L (ref 136–145)
TRIGL SERPL-MCNC: 100 MG/DL (ref 0–150)
VLDLC SERPL CALC-MCNC: 20 MG/DL

## 2025-08-20 ENCOUNTER — TELEMEDICINE (OUTPATIENT)
Age: 35
End: 2025-08-20

## 2025-08-20 DIAGNOSIS — Z79.4 TYPE 2 DIABETES MELLITUS WITHOUT COMPLICATION, WITH LONG-TERM CURRENT USE OF INSULIN (HCC): ICD-10-CM

## 2025-08-20 DIAGNOSIS — E11.9 TYPE 2 DIABETES MELLITUS WITHOUT COMPLICATION, WITH LONG-TERM CURRENT USE OF INSULIN (HCC): ICD-10-CM

## 2025-08-20 DIAGNOSIS — E78.2 MIXED HYPERLIPIDEMIA: Primary | ICD-10-CM

## 2025-08-20 PROCEDURE — 3051F HG A1C>EQUAL 7.0%<8.0%: CPT | Performed by: INTERNAL MEDICINE

## 2025-08-20 PROCEDURE — 99214 OFFICE O/P EST MOD 30 MIN: CPT | Performed by: INTERNAL MEDICINE

## 2025-08-20 SDOH — ECONOMIC STABILITY: FOOD INSECURITY: WITHIN THE PAST 12 MONTHS, YOU WORRIED THAT YOUR FOOD WOULD RUN OUT BEFORE YOU GOT MONEY TO BUY MORE.: NEVER TRUE

## 2025-08-20 SDOH — ECONOMIC STABILITY: FOOD INSECURITY: WITHIN THE PAST 12 MONTHS, THE FOOD YOU BOUGHT JUST DIDN'T LAST AND YOU DIDN'T HAVE MONEY TO GET MORE.: NEVER TRUE

## 2025-08-20 ASSESSMENT — LIFESTYLE VARIABLES
HOW MANY STANDARD DRINKS CONTAINING ALCOHOL DO YOU HAVE ON A TYPICAL DAY: PATIENT DOES NOT DRINK
HOW OFTEN DO YOU HAVE A DRINK CONTAINING ALCOHOL: NEVER

## 2025-08-20 ASSESSMENT — PATIENT HEALTH QUESTIONNAIRE - PHQ9
2. FEELING DOWN, DEPRESSED OR HOPELESS: NOT AT ALL
SUM OF ALL RESPONSES TO PHQ QUESTIONS 1-9: 0
SUM OF ALL RESPONSES TO PHQ QUESTIONS 1-9: 0
1. LITTLE INTEREST OR PLEASURE IN DOING THINGS: NOT AT ALL
SUM OF ALL RESPONSES TO PHQ QUESTIONS 1-9: 0
SUM OF ALL RESPONSES TO PHQ QUESTIONS 1-9: 0

## 2025-09-03 ENCOUNTER — CLINICAL SUPPORT (OUTPATIENT)
Age: 35
End: 2025-09-03

## 2025-09-03 DIAGNOSIS — Z71.89 COUNSELING AND COORDINATION OF CARE: Primary | ICD-10-CM
